# Patient Record
Sex: MALE | Race: WHITE | Employment: FULL TIME | URBAN - METROPOLITAN AREA
[De-identification: names, ages, dates, MRNs, and addresses within clinical notes are randomized per-mention and may not be internally consistent; named-entity substitution may affect disease eponyms.]

---

## 2020-12-10 ENCOUNTER — APPOINTMENT (OUTPATIENT)
Dept: GENERAL RADIOLOGY | Age: 60
DRG: 287 | End: 2020-12-10
Attending: EMERGENCY MEDICINE
Payer: COMMERCIAL

## 2020-12-10 ENCOUNTER — HOSPITAL ENCOUNTER (INPATIENT)
Age: 60
LOS: 4 days | Discharge: ANOTHER HOSPITAL | DRG: 287 | End: 2020-12-15
Attending: EMERGENCY MEDICINE | Admitting: INTERNAL MEDICINE
Payer: COMMERCIAL

## 2020-12-10 DIAGNOSIS — R09.89 BILATERAL CAROTID BRUITS: ICD-10-CM

## 2020-12-10 DIAGNOSIS — Z79.4 INSULIN DEPENDENT TYPE 2 DIABETES MELLITUS (HCC): ICD-10-CM

## 2020-12-10 DIAGNOSIS — I24.9 ACS (ACUTE CORONARY SYNDROME) (HCC): Primary | ICD-10-CM

## 2020-12-10 DIAGNOSIS — I73.9 PAD (PERIPHERAL ARTERY DISEASE) (HCC): ICD-10-CM

## 2020-12-10 DIAGNOSIS — E11.9 INSULIN DEPENDENT TYPE 2 DIABETES MELLITUS (HCC): ICD-10-CM

## 2020-12-10 DIAGNOSIS — E10.69 TYPE 1 DIABETES MELLITUS WITH OTHER SPECIFIED COMPLICATION (HCC): ICD-10-CM

## 2020-12-10 DIAGNOSIS — R07.2 PRECORDIAL PAIN: ICD-10-CM

## 2020-12-10 DIAGNOSIS — I25.700 CORONARY ARTERY DISEASE INVOLVING CORONARY BYPASS GRAFT OF NATIVE HEART WITH UNSTABLE ANGINA PECTORIS (HCC): ICD-10-CM

## 2020-12-10 PROBLEM — R07.9 CHEST PAIN: Status: ACTIVE | Noted: 2020-12-10

## 2020-12-10 LAB
ALBUMIN SERPL-MCNC: 3.7 G/DL (ref 3.5–5)
ALBUMIN/GLOB SERPL: 1.3 {RATIO} (ref 1.2–3.5)
ALP SERPL-CCNC: 76 U/L (ref 50–136)
ALT SERPL-CCNC: 28 U/L (ref 12–65)
ANION GAP SERPL CALC-SCNC: 6 MMOL/L (ref 7–16)
AST SERPL-CCNC: 20 U/L (ref 15–37)
BASOPHILS # BLD: 0 K/UL (ref 0–0.2)
BASOPHILS NFR BLD: 1 % (ref 0–2)
BILIRUB SERPL-MCNC: 0.3 MG/DL (ref 0.2–1.1)
BUN SERPL-MCNC: 18 MG/DL (ref 6–23)
CALCIUM SERPL-MCNC: 8.8 MG/DL (ref 8.3–10.4)
CHLORIDE SERPL-SCNC: 109 MMOL/L (ref 98–107)
CO2 SERPL-SCNC: 26 MMOL/L (ref 21–32)
CREAT SERPL-MCNC: 0.75 MG/DL (ref 0.8–1.5)
DIFFERENTIAL METHOD BLD: ABNORMAL
EOSINOPHIL # BLD: 0.2 K/UL (ref 0–0.8)
EOSINOPHIL NFR BLD: 4 % (ref 0.5–7.8)
ERYTHROCYTE [DISTWIDTH] IN BLOOD BY AUTOMATED COUNT: 12.1 % (ref 11.9–14.6)
EST. AVERAGE GLUCOSE BLD GHB EST-MCNC: 192 MG/DL
GLOBULIN SER CALC-MCNC: 2.9 G/DL (ref 2.3–3.5)
GLUCOSE BLD STRIP.AUTO-MCNC: 275 MG/DL (ref 65–100)
GLUCOSE SERPL-MCNC: 82 MG/DL (ref 65–100)
HBA1C MFR BLD: 8.3 % (ref 4.8–6)
HCT VFR BLD AUTO: 34.2 % (ref 41.1–50.3)
HGB BLD-MCNC: 11.8 G/DL (ref 13.6–17.2)
IMM GRANULOCYTES # BLD AUTO: 0 K/UL (ref 0–0.5)
IMM GRANULOCYTES NFR BLD AUTO: 0 % (ref 0–5)
LYMPHOCYTES # BLD: 2 K/UL (ref 0.5–4.6)
LYMPHOCYTES NFR BLD: 38 % (ref 13–44)
MCH RBC QN AUTO: 33.9 PG (ref 26.1–32.9)
MCHC RBC AUTO-ENTMCNC: 34.5 G/DL (ref 31.4–35)
MCV RBC AUTO: 98.3 FL (ref 79.6–97.8)
MONOCYTES # BLD: 0.4 K/UL (ref 0.1–1.3)
MONOCYTES NFR BLD: 7 % (ref 4–12)
NEUTS SEG # BLD: 2.6 K/UL (ref 1.7–8.2)
NEUTS SEG NFR BLD: 50 % (ref 43–78)
NRBC # BLD: 0 K/UL (ref 0–0.2)
PLATELET # BLD AUTO: 225 K/UL (ref 150–450)
PMV BLD AUTO: 9.6 FL (ref 9.4–12.3)
POTASSIUM SERPL-SCNC: 4.2 MMOL/L (ref 3.5–5.1)
PROT SERPL-MCNC: 6.6 G/DL (ref 6.3–8.2)
RBC # BLD AUTO: 3.48 M/UL (ref 4.23–5.6)
SODIUM SERPL-SCNC: 141 MMOL/L (ref 136–145)
T4 FREE SERPL-MCNC: 1.2 NG/DL (ref 0.9–1.8)
TROPONIN-HIGH SENSITIVITY: 7.5 PG/ML (ref 0–14)
TROPONIN-HIGH SENSITIVITY: 8 PG/ML (ref 0–14)
TROPONIN-HIGH SENSITIVITY: 8.4 PG/ML (ref 0–14)
TSH SERPL DL<=0.005 MIU/L-ACNC: 5.2 UIU/ML (ref 0.36–3.74)
WBC # BLD AUTO: 5.3 K/UL (ref 4.3–11.1)

## 2020-12-10 PROCEDURE — 74011250636 HC RX REV CODE- 250/636: Performed by: INTERNAL MEDICINE

## 2020-12-10 PROCEDURE — 84484 ASSAY OF TROPONIN QUANT: CPT

## 2020-12-10 PROCEDURE — 80053 COMPREHEN METABOLIC PANEL: CPT

## 2020-12-10 PROCEDURE — 99284 EMERGENCY DEPT VISIT MOD MDM: CPT

## 2020-12-10 PROCEDURE — 99218 HC RM OBSERVATION: CPT

## 2020-12-10 PROCEDURE — 74011250637 HC RX REV CODE- 250/637: Performed by: NURSE PRACTITIONER

## 2020-12-10 PROCEDURE — 84439 ASSAY OF FREE THYROXINE: CPT

## 2020-12-10 PROCEDURE — 83036 HEMOGLOBIN GLYCOSYLATED A1C: CPT

## 2020-12-10 PROCEDURE — 84443 ASSAY THYROID STIM HORMONE: CPT

## 2020-12-10 PROCEDURE — 36415 COLL VENOUS BLD VENIPUNCTURE: CPT

## 2020-12-10 PROCEDURE — 96374 THER/PROPH/DIAG INJ IV PUSH: CPT

## 2020-12-10 PROCEDURE — C8929 TTE W OR WO FOL WCON,DOPPLER: HCPCS

## 2020-12-10 PROCEDURE — 93005 ELECTROCARDIOGRAM TRACING: CPT | Performed by: EMERGENCY MEDICINE

## 2020-12-10 PROCEDURE — 74011250637 HC RX REV CODE- 250/637: Performed by: EMERGENCY MEDICINE

## 2020-12-10 PROCEDURE — 71046 X-RAY EXAM CHEST 2 VIEWS: CPT

## 2020-12-10 PROCEDURE — 85025 COMPLETE CBC W/AUTO DIFF WBC: CPT

## 2020-12-10 PROCEDURE — 99220 PR INITIAL OBSERVATION CARE/DAY 70 MINUTES: CPT | Performed by: INTERNAL MEDICINE

## 2020-12-10 PROCEDURE — 74011250636 HC RX REV CODE- 250/636: Performed by: NURSE PRACTITIONER

## 2020-12-10 PROCEDURE — 74011000250 HC RX REV CODE- 250: Performed by: INTERNAL MEDICINE

## 2020-12-10 PROCEDURE — 2709999900 HC NON-CHARGEABLE SUPPLY

## 2020-12-10 PROCEDURE — 82962 GLUCOSE BLOOD TEST: CPT

## 2020-12-10 RX ORDER — LEVOTHYROXINE SODIUM 100 UG/1
100 TABLET ORAL
COMMUNITY

## 2020-12-10 RX ORDER — NITROGLYCERIN 0.4 MG/1
0.4 TABLET SUBLINGUAL
Status: DISCONTINUED | OUTPATIENT
Start: 2020-12-10 | End: 2020-12-15 | Stop reason: HOSPADM

## 2020-12-10 RX ORDER — GUAIFENESIN 100 MG/5ML
81 LIQUID (ML) ORAL DAILY
Status: DISCONTINUED | OUTPATIENT
Start: 2020-12-11 | End: 2020-12-15 | Stop reason: HOSPADM

## 2020-12-10 RX ORDER — ASPIRIN 81 MG/1
81 TABLET ORAL DAILY
COMMUNITY

## 2020-12-10 RX ORDER — SODIUM CHLORIDE 0.9 % (FLUSH) 0.9 %
5-40 SYRINGE (ML) INJECTION EVERY 8 HOURS
Status: DISCONTINUED | OUTPATIENT
Start: 2020-12-10 | End: 2020-12-15 | Stop reason: HOSPADM

## 2020-12-10 RX ORDER — SODIUM CHLORIDE 0.9 % (FLUSH) 0.9 %
5-40 SYRINGE (ML) INJECTION AS NEEDED
Status: DISCONTINUED | OUTPATIENT
Start: 2020-12-10 | End: 2020-12-15 | Stop reason: HOSPADM

## 2020-12-10 RX ORDER — CLOPIDOGREL BISULFATE 75 MG/1
75 TABLET ORAL DAILY
COMMUNITY
End: 2020-12-15

## 2020-12-10 RX ORDER — MORPHINE SULFATE 2 MG/ML
2 INJECTION, SOLUTION INTRAMUSCULAR; INTRAVENOUS
Status: DISCONTINUED | OUTPATIENT
Start: 2020-12-10 | End: 2020-12-15 | Stop reason: HOSPADM

## 2020-12-10 RX ORDER — INSULIN LISPRO 100 [IU]/ML
INJECTION, SOLUTION INTRAVENOUS; SUBCUTANEOUS
Status: DISCONTINUED | OUTPATIENT
Start: 2020-12-10 | End: 2020-12-11

## 2020-12-10 RX ORDER — CLOPIDOGREL BISULFATE 75 MG/1
75 TABLET ORAL DAILY
Status: DISCONTINUED | OUTPATIENT
Start: 2020-12-11 | End: 2020-12-11

## 2020-12-10 RX ORDER — ATORVASTATIN CALCIUM 40 MG/1
40 TABLET, FILM COATED ORAL DAILY
Status: DISCONTINUED | OUTPATIENT
Start: 2020-12-11 | End: 2020-12-11

## 2020-12-10 RX ORDER — METOPROLOL TARTRATE 25 MG/1
12.5 TABLET, FILM COATED ORAL EVERY 12 HOURS
Status: DISCONTINUED | OUTPATIENT
Start: 2020-12-10 | End: 2020-12-15 | Stop reason: HOSPADM

## 2020-12-10 RX ORDER — INSULIN ASPART 100 [IU]/ML
INJECTION, SOLUTION INTRAVENOUS; SUBCUTANEOUS
COMMUNITY
End: 2020-12-15

## 2020-12-10 RX ORDER — SODIUM CHLORIDE 9 MG/ML
75 INJECTION, SOLUTION INTRAVENOUS CONTINUOUS
Status: DISCONTINUED | OUTPATIENT
Start: 2020-12-11 | End: 2020-12-12

## 2020-12-10 RX ADMIN — PERFLUTREN 1 ML: 6.52 INJECTION, SUSPENSION INTRAVENOUS at 15:39

## 2020-12-10 RX ADMIN — Medication 10 ML: at 20:29

## 2020-12-10 RX ADMIN — METOPROLOL TARTRATE 12.5 MG: 25 TABLET, FILM COATED ORAL at 20:30

## 2020-12-10 RX ADMIN — NITROGLYCERIN 1 INCH: 20 OINTMENT TOPICAL at 13:07

## 2020-12-10 RX ADMIN — SODIUM CHLORIDE 75 ML/HR: 900 INJECTION, SOLUTION INTRAVENOUS at 23:39

## 2020-12-10 NOTE — ED TRIAGE NOTES
Patient arrived via EMS from private home. Wearing face mask. Patient was at work and started having CP that was constant. Increased SOB with cp. HR 84bpm NSR, , O2 sat 97% on RA (EMS placed [facility] on patient for comfort), temp 98.4F oral, EMS gave ASA 324mg. IV started 20g left FA.

## 2020-12-10 NOTE — PROGRESS NOTES
TRANSFER - IN REPORT:    Verbal report received from Grady RN(name) on Radu Castro  being received from ER(unit) for routine progression of care      Report consisted of patients Situation, Background, Assessment and   Recommendations(SBAR). Information from the following report(s) ED Summary was reviewed with the receiving nurse. Opportunity for questions and clarification was provided. Assessment completed upon patients arrival to unit and care assumed.

## 2020-12-10 NOTE — PROGRESS NOTES
Primary Nurse Gabe Woodruff RN  performed a dual skin assessment on this patient No impairment noted.  Insulin pump and monitor intact  Carlitos score is 23

## 2020-12-10 NOTE — ED PROVIDER NOTES
60-year-old male presents with complaints of sternal chest pain. Described as tightness  Nonradiating  Associated with some shortness of breath  No nausea vomiting or diarrhea  No fever chills or cough    Patient reports symptoms began while doing light activities at work    Patient also reports that he had has previous heart catheterization with no interventions. States his heart cath was about 2 years ago  Patient does have a history of peripheral artery disease and has had arterial stents in his legs  Patient is a lifelong insulin-dependent diabetic  Currently monitoring his sugars with an insulin pump    The history is provided by the patient. Chest Pain    This is a new problem. The current episode started 3 to 5 hours ago. The problem has been resolved. The problem occurs constantly. The pain is associated with normal activity. The pain is present in the substernal region. The pain is moderate. The quality of the pain is described as pressure-like and tightness. The pain does not radiate. Exacerbated by: Denies specific exacerbating or alleviating factors. Associated symptoms include malaise/fatigue and shortness of breath. Pertinent negatives include no abdominal pain, no back pain, no cough, no diaphoresis, no dizziness, no exertional chest pressure, no fever, no headaches, no hemoptysis, no leg pain, no lower extremity edema, no nausea, no numbness, no palpitations, no sputum production and no vomiting. He has tried aspirin for the symptoms. The treatment provided mild relief. Risk factors include family history and male gender (Peripheral artery disease with stenting). His past medical history does not include DM. Procedural history includes cardiac catheterization. Pertinent negatives include no cardiac stents and no CABG. No past medical history on file. No past surgical history on file. No family history on file.     Social History     Socioeconomic History    Marital status:  Spouse name: Not on file    Number of children: Not on file    Years of education: Not on file    Highest education level: Not on file   Occupational History    Not on file   Social Needs    Financial resource strain: Not on file    Food insecurity     Worry: Not on file     Inability: Not on file    Transportation needs     Medical: Not on file     Non-medical: Not on file   Tobacco Use    Smoking status: Not on file   Substance and Sexual Activity    Alcohol use: Not on file    Drug use: Not on file    Sexual activity: Not on file   Lifestyle    Physical activity     Days per week: Not on file     Minutes per session: Not on file    Stress: Not on file   Relationships    Social connections     Talks on phone: Not on file     Gets together: Not on file     Attends Congregation service: Not on file     Active member of club or organization: Not on file     Attends meetings of clubs or organizations: Not on file     Relationship status: Not on file    Intimate partner violence     Fear of current or ex partner: Not on file     Emotionally abused: Not on file     Physically abused: Not on file     Forced sexual activity: Not on file   Other Topics Concern    Not on file   Social History Narrative    Not on file         ALLERGIES: Patient has no known allergies. Review of Systems   Constitutional: Positive for malaise/fatigue. Negative for activity change, chills, diaphoresis and fever. HENT: Negative for dental problem, hearing loss, nosebleeds, rhinorrhea and sore throat. Eyes: Negative for pain, discharge, redness and visual disturbance. Respiratory: Positive for shortness of breath. Negative for cough, hemoptysis, sputum production and chest tightness. Cardiovascular: Positive for chest pain. Negative for palpitations and leg swelling. Gastrointestinal: Negative for abdominal pain, constipation, diarrhea, nausea and vomiting.    Endocrine: Negative for cold intolerance, heat intolerance, polydipsia and polyuria. Genitourinary: Negative for dysuria and flank pain. Musculoskeletal: Negative for arthralgias, back pain, joint swelling, myalgias and neck pain. Skin: Negative for pallor and rash. Allergic/Immunologic: Negative for environmental allergies and food allergies. Neurological: Negative for dizziness, tremors, light-headedness, numbness and headaches. Hematological: Negative for adenopathy. Does not bruise/bleed easily. Psychiatric/Behavioral: Negative for confusion and dysphoric mood. The patient is not nervous/anxious and is not hyperactive. All other systems reviewed and are negative. Vitals:    12/10/20 1150 12/10/20 1306   BP: 134/68 (!) 158/70   Pulse: 96 76   Resp: 16    Temp: 97.9 °F (36.6 °C)    SpO2: 99%    Weight: 68.9 kg (152 lb)    Height: 6' (1.829 m)             Physical Exam  Vitals signs and nursing note reviewed. Constitutional:       General: He is in acute distress. Appearance: He is well-developed and normal weight. HENT:      Head: Normocephalic and atraumatic. Right Ear: External ear normal.      Left Ear: External ear normal.      Mouth/Throat:      Pharynx: No oropharyngeal exudate. Eyes:      General: No scleral icterus. Extraocular Movements: Extraocular movements intact. Conjunctiva/sclera: Conjunctivae normal.      Pupils: Pupils are equal, round, and reactive to light. Neck:      Musculoskeletal: Normal range of motion and neck supple. Thyroid: No thyromegaly. Vascular: No JVD. Cardiovascular:      Rate and Rhythm: Normal rate and regular rhythm. Heart sounds: Normal heart sounds. No murmur. No friction rub. No gallop. Pulmonary:      Effort: Pulmonary effort is normal. No respiratory distress. Breath sounds: Normal breath sounds. No decreased breath sounds or wheezing. Abdominal:      General: Bowel sounds are normal. There is no distension. Palpations: Abdomen is soft. Tenderness: There is no abdominal tenderness. Musculoskeletal: Normal range of motion. General: No tenderness or deformity. Skin:     General: Skin is warm and dry. Capillary Refill: Capillary refill takes less than 2 seconds. Findings: No rash. Neurological:      General: No focal deficit present. Mental Status: He is alert and oriented to person, place, and time. Cranial Nerves: No cranial nerve deficit. Sensory: No sensory deficit. Motor: No abnormal muscle tone. Coordination: Coordination normal.   Psychiatric:         Mood and Affect: Mood normal.         Behavior: Behavior normal.         Thought Content: Thought content normal.         Judgment: Judgment normal.          MDM  Number of Diagnoses or Management Options  ACS (acute coronary syndrome) [facility]): new and requires workup  Diagnosis management comments: Patient given aspirin by EMS in route to the hospital    EKG reviewed  Sinus rhythm  Normal axis, normal intervals no ectopy  No acute ischemic changes      2:46 PM  Initial troponin negative  Other lab work unremarkable    Case reviewed with cardiology  They will admit the patient for heart cath in the a.m. Amount and/or Complexity of Data Reviewed  Clinical lab tests: ordered and reviewed  Tests in the radiology section of CPT®: ordered and reviewed  Tests in the medicine section of CPT®: ordered and reviewed  Decide to obtain previous medical records or to obtain history from someone other than the patient: yes  Review and summarize past medical records: yes  Discuss the patient with other providers: yes  Independent visualization of images, tracings, or specimens: yes    Risk of Complications, Morbidity, and/or Mortality  Presenting problems: high  Diagnostic procedures: moderate  Management options: moderate  General comments: Elements of this note have been dictated via voice recognition software.   Text and phrases may be limited by the accuracy of the software. The chart has been reviewed, but errors may still be present.       Patient Progress  Patient progress: stable         Procedures

## 2020-12-10 NOTE — H&P
[place] [facility] History & Physical      Date of  Admission: 12/10/2020 12:34 PM     Primary Care Physician: Unknown  Primary Cardiologist: None  Referring Physician: Dr Espino Notice  Admitting Physician: Dr Mon Minus: Chest pain     HPI:  Farshad Newman is a 63 y.o. male with PMH of PAD s/p stents, insulin-dependent DM, dyslipidemia, HTN, hypothyroidism, and nicotine use who presented to [facility] ED on 12/10 with complaint of chest pain. 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 Describes chest pain as substernal and pressure-like. Had associated SOB. Denies any radiation, N/V, or dizziness. Nothing made pain worse and nothing improved the pain. Rated pain 3/10. Took 4 baby ASA. Pain resolved on its own prior to arrival to ED. Patient reports several episodes of similar chest discomfort over the last several months but states these episodes haven't lasted as long as today's episode. Upon arrival to ED, chest pain has resolved, EKG showed NSR, high sensitivity troponin 8.0, WBC 5.5, H/H 11.8/34.2, Ptl 225, BUN/Cr 18/0.75. Cardiology consulted for further evaluation.         PMH:  SEE ABOVE    No Known Allergies   Social History     Socioeconomic History    Marital status:      Spouse name: Not on file    Number of children: Not on file    Years of education: Not on file    Highest education level: Not on file   Occupational History    Not on file   Social Needs    Financial resource strain: Not on file    Food insecurity     Worry: Not on file     Inability: Not on file    Transportation needs     Medical: Not on file     Non-medical: Not on file   Tobacco Use    Smoking status: Not on file   Substance and Sexual Activity    Alcohol use: Not on file    Drug use: Not on file    Sexual activity: Not on file   Lifestyle    Physical activity     Days per week: Not on file     Minutes per session: Not on file    Stress: Not on file   Relationships    Pharmacy Vancomycin Initial Note  Date of Service 2017  Patient's  1957  59 year old, female    Indication: diabetic foot infection    Current estimated CrCl = Estimated Creatinine Clearance: 50.9 mL/min (based on Cr of 1.45).    Creatinine for last 3 days  2017:  9:20 PM Creatinine 1.45 mg/dL    Recent Vancomycin Level(s) for last 3 days  No results found for requested labs within last 72 hours.      Vancomycin IV Administrations (past 72 hours)                   vancomycin (VANCOCIN) 1000 mg in dextrose 5% 200 mL PREMIX (mg) 1,000 mg New Bag 17 0141                Nephrotoxins and other renal medications (Future)    Start     Dose/Rate Route Frequency Ordered Stop    17 1400  vancomycin (VANCOCIN) 2,000 mg in NaCl 0.9 % 500 mL intermittent infusion      2,000 mg Intravenous EVERY 12 HOURS 17 0523      17 0900  lisinopril (PRINIVIL/ZESTRIL) tablet 5 mg      5 mg Oral DAILY 17 0455      17 0515  vancomycin (VANCOCIN) 1000 mg in dextrose 5% 200 mL PREMIX      1,000 mg Intravenous ONCE 17 0508      17 0000  ampicillin-sulbactam (UNASYN) 3 g vial to attach to  mL bag      3 g  over 1 Hours Intravenous EVERY 6 HOURS 17 2357            Contrast Orders - past 72 hours     None                Plan:  1.  Start vancomycin  2000 mg IV q12h.   2.  Goal Trough Level: 15-20 mg/L   3.  Pharmacy will check trough levels as appropriate in 1-3 Days.    4. Serum creatinine levels will be ordered daily for the first week of therapy and at least twice weekly for subsequent weeks.    5. [place] method utilized to dose vancomycin therapy: Method 1    Kristie YoungOur Lady of Fat[facility] HospitalVirginia       Social connections     Talks on phone: Not on file     Gets together: Not on file     Attends [employer] service: Not on file     Active member of club or organization: Not on file     Attends meetings of clubs or organizations: Not on file     Relationship status: Not on file    Intimate partner violence     Fear of current or ex partner: Not on file     Emotionally abused: Not on file     Physically abused: Not on file     Forced sexual activity: Not on file   Other Topics Concern    Not on file   Social History Narrative    Not on file     Family History- Brother with PCI IN EARLY 60'S     Current Facility-Administered Medications   Medication Dose Route Frequency    perflutren lipid microspheres (DEFINITY) in NS bolus IV  1 mL IntraVENous PRN     Review of Symptoms:  General: No weight changes,  weakness, fever or chills  Skin: no rashes, lumps, or other skin changes  HEENT: no headache, dizziness, lightheadedness, vision changes, hearing changes, tinnitus, vertigo, sinus pressure/pain, bleeding gums, sore throat, or hoarseness  Neck: no swollen glands, goiter, pain or stiffness  Respiratory: Positive for dyspnea. No cough, sputum, hemoptysis, wheezing  Cardiovascular: Positive for chest pain, dyspnea.  No palpitations,  orthopnea, paroxysmal nocturnal dyspnea, peripheral edema   Gastrointestinal: no GERD, constipation, diarrhea, liver problems, or h/o GI bleed  Urinary: no frequency, urgency , hematuria, burning/pain with urination, recent flank pain, polyuria, nocturia, or difficulty urinating  Peripheral Vascular: no claudication, leg cramps, prior DVTs, swelling of calves, legs, or feet, color change, or swelling with redness or tenderness  Musculoskeletal: no muscle or joint pain/stiffness, joint swelling, erythema of joints, or back pain  Psychiatric: no depression or excessive stress  Neurological: no sensory or motor loss, seizures, syncope, tremors, numbness, no dementia  Hematologic: no anemia, easy bruising or bleeding  Endocrine: Positive for thyroid problems, diabetes.      Subjective:     Physical Exam:    Vitals:    12/10/20 1306 12/10/20 1320 12/10/20 1350 12/10/20 1514   BP: (!) 158/70 (!) 148/60 129/63 (!) 146/67   Pulse: 76 70 68 79   Resp:       Temp:       SpO2:       Weight:       Height:           General: Well Developed, Well Nourished, No Acute Distress  HEENT: pupils equal and round, no abnormalities noted  Neck: supple, no JVD, positive for carotid bruits  Heart: S1S2 with RRR without murmurs or gallops  Lungs: Clear throughout auscultation bilaterally without adventitious sounds  Abd: soft, nontender, nondistended, with good bowel sounds  Ext: warm, no edema, calves supple/nontender, pulses 2+ bilaterally  Skin: warm and dry  Psychiatric: Normal mood and affect  Neurologic: Alert and oriented X 3    Cardiographics    Telemetry: normal sinus rhythm  ECG: normal EKG, normal sinus rhythm, there are no previous tracings available for comparison  Echocardiogram: ordered     Labs:   Recent Results (from the past 24 hour(s))   EKG, 12 LEAD, INITIAL    Collection Time: 12/10/20 11:52 AM   Result Value Ref Range    Ventricular Rate 69 BPM    Atrial Rate 69 BPM    P-R Interval 130 ms    QRS Duration 84 ms    Q-T Interval 364 ms    QTC Calculation (Bezet) 390 ms    Calculated P Axis 77 degrees    Calculated R Axis 68 degrees    Calculated T Axis 78 degrees    Diagnosis       Normal sinus rhythm  Normal ECG  No previous ECGs available     CBC WITH AUTOMATED DIFF    Collection Time: 12/10/20 12:03 PM   Result Value Ref Range    WBC 5.3 4.3 - 11.1 K/uL    RBC 3.48 (L) 4.23 - 5.6 M/uL    HGB 11.8 (L) 13.6 - 17.2 g/dL    HCT 34.2 (L) 41.1 - 50.3 %    MCV 98.3 (H) 79.6 - 97.8 FL    MCH 33.9 (H) 26.1 - 32.9 PG    MCHC 34.5 31.4 - 35.0 g/dL    RDW 12.1 11.9 - 14.6 %    PLATELET 167 192 - 272 K/uL    MPV 9.6 9.4 - 12.3 FL    ABSOLUTE NRBC 0.00 0.0 - 0.2 K/uL    DF AUTOMATED      NEUTROPHILS 50 43 - 78 % LYMPHOCYTES 38 13 - 44 %    MONOCYTES 7 4.0 - 12.0 %    EOSINOPHILS 4 0.5 - 7.8 %    BASOPHILS 1 0.0 - 2.0 %    IMMATURE GRANULOCYTES 0 0.0 - 5.0 %    ABS. NEUTROPHILS 2.6 1.7 - 8.2 K/UL    ABS. LYMPHOCYTES 2.0 0.5 - 4.6 K/UL    ABS. MONOCYTES 0.4 0.1 - 1.3 K/UL    ABS. EOSINOPHILS 0.2 0.0 - 0.8 K/UL    ABS. BASOPHILS 0.0 0.0 - 0.2 K/UL    ABS. IMM. GRANS. 0.0 0.0 - 0.5 K/UL   METABOLIC PANEL, COMPREHENSIVE    Collection Time: 12/10/20 12:03 PM   Result Value Ref Range    Sodium 141 136 - 145 mmol/L    Potassium 4.2 3.5 - 5.1 mmol/L    Chloride 109 (H) 98 - 107 mmol/L    CO2 26 21 - 32 mmol/L    Anion gap 6 (L) 7 - 16 mmol/L    Glucose 82 65 - 100 mg/dL    BUN 18 6 - 23 MG/DL    Creatinine 0.75 (L) 0.8 - 1.5 MG/DL    GFR est AA >60 >60 ml/min/1.73m2    GFR est non-AA >60 >60 ml/min/1.73m2    Calcium 8.8 8.3 - 10.4 MG/DL    Bilirubin, total 0.3 0.2 - 1.1 MG/DL    ALT (SGPT) 28 12 - 65 U/L    AST (SGOT) 20 15 - 37 U/L    Alk. phosphatase 76 50 - 136 U/L    Protein, total 6.6 6.3 - 8.2 g/dL    Albumin 3.7 3.5 - 5.0 g/dL    Globulin 2.9 2.3 - 3.5 g/dL    A-G Ratio 1.3 1.2 - 3.5     TROPONIN-HIGH SENSITIVITY    Collection Time: 12/10/20 12:03 PM   Result Value Ref Range    Troponin-High Sensitivity 8.0 0 - 14 pg/mL   TROPONIN-HIGH SENSITIVITY    Collection Time: 12/10/20  2:03 PM   Result Value Ref Range    Troponin-High Sensitivity 7.5 0 - 14 pg/mL     Pt has been seen and examined by Dr. Azul Quispe and he agrees with the following assessment and plan:     Assessment/Plan:      Chest pain- admit to telemetry, trend troponin, ECHO, start low dose BB, cont ASA, Plavix, NPO after midnight for LHC in morning     Insulin-dependent DM- SSI, check Hgb A1C    Dyslipidemia- Lipid panel in am, change Zocor to Lipitor    Carotid bruits- Ultrasound Carotids    PAD s/p stents- cont ASA, Plavix    KIKI Dominguez    Physician Assistant    Cardiology        ATTENDING ADDENDUM:    Patient seen and examined by me.   Agree with above note by physician extender. Key findings are:  No CP or RODRIGUEZ AT PRESENT but exertional CP with radiation across chest with SOB during mild exertion at work today. ECG and trop negative x 1. No pain or SOB at present. Known PAD s/p prior LE stenting in [facility], bilateral carotid bruits on exam today and IDDM with insulin pump, last A1C in 7's per his report. CV- RRR without murmur, BL carotid bruit R>L  Lungs- Clear bilaterally  Abd- soft, nontender, nondistended  Ext- no edema    Plan: As above. Admit, rule out, echo today, A1C and Lipids in AM, carotid duplex in AM, continue home meds with addition of heparin gtt with more CP or + HS trop overnight. Hydrate for LHC poss tomorrow. The benefits and risks of left heart catheterization and possible percutaneous intervention were discussed with the patient. Risks including but not limited to bleeding, infection, contrast allergy reaction, acute kidney injury, MI, stroke, emergent CABG and death were discussed. The patient understands the risks of the procedure and wishes to proceed.      Aliyah Pradhan MD  [facility]  Pager [phone]

## 2020-12-11 ENCOUNTER — APPOINTMENT (OUTPATIENT)
Dept: ULTRASOUND IMAGING | Age: 60
DRG: 287 | End: 2020-12-11
Attending: NURSE PRACTITIONER
Payer: COMMERCIAL

## 2020-12-11 PROBLEM — E10.69 TYPE 1 DIABETES MELLITUS WITH OTHER SPECIFIED COMPLICATION (HCC): Status: ACTIVE | Noted: 2020-12-11

## 2020-12-11 PROBLEM — I20.0 UNSTABLE ANGINA (HCC): Status: ACTIVE | Noted: 2020-12-11

## 2020-12-11 LAB
ALBUMIN SERPL-MCNC: 3.2 G/DL (ref 3.5–5)
ALBUMIN/GLOB SERPL: 1.2 {RATIO} (ref 1.2–3.5)
ALP SERPL-CCNC: 68 U/L (ref 50–136)
ALT SERPL-CCNC: 24 U/L (ref 12–65)
ANION GAP SERPL CALC-SCNC: 3 MMOL/L (ref 7–16)
AST SERPL-CCNC: 15 U/L (ref 15–37)
ATRIAL RATE: 69 BPM
BASOPHILS # BLD: 0.1 K/UL (ref 0–0.2)
BASOPHILS NFR BLD: 1 % (ref 0–2)
BILIRUB DIRECT SERPL-MCNC: 0.1 MG/DL
BILIRUB SERPL-MCNC: 0.4 MG/DL (ref 0.2–1.1)
BUN SERPL-MCNC: 17 MG/DL (ref 6–23)
CALCIUM SERPL-MCNC: 8.5 MG/DL (ref 8.3–10.4)
CALCULATED P AXIS, ECG09: 77 DEGREES
CALCULATED R AXIS, ECG10: 68 DEGREES
CALCULATED T AXIS, ECG11: 78 DEGREES
CHLORIDE SERPL-SCNC: 112 MMOL/L (ref 98–107)
CHOLEST SERPL-MCNC: 141 MG/DL
CO2 SERPL-SCNC: 29 MMOL/L (ref 21–32)
CREAT SERPL-MCNC: 0.71 MG/DL (ref 0.8–1.5)
DIAGNOSIS, 93000: NORMAL
DIFFERENTIAL METHOD BLD: ABNORMAL
EOSINOPHIL # BLD: 0.3 K/UL (ref 0–0.8)
EOSINOPHIL NFR BLD: 6 % (ref 0.5–7.8)
ERYTHROCYTE [DISTWIDTH] IN BLOOD BY AUTOMATED COUNT: 12 % (ref 11.9–14.6)
GLOBULIN SER CALC-MCNC: 2.6 G/DL (ref 2.3–3.5)
GLUCOSE BLD STRIP.AUTO-MCNC: 180 MG/DL (ref 65–100)
GLUCOSE BLD STRIP.AUTO-MCNC: 281 MG/DL (ref 65–100)
GLUCOSE BLD STRIP.AUTO-MCNC: 78 MG/DL (ref 65–100)
GLUCOSE SERPL-MCNC: 86 MG/DL (ref 65–100)
HCT VFR BLD AUTO: 33.7 % (ref 41.1–50.3)
HDLC SERPL-MCNC: 48 MG/DL (ref 40–60)
HDLC SERPL: 2.9 {RATIO}
HGB BLD-MCNC: 11.8 G/DL (ref 13.6–17.2)
IMM GRANULOCYTES # BLD AUTO: 0 K/UL (ref 0–0.5)
IMM GRANULOCYTES NFR BLD AUTO: 0 % (ref 0–5)
LDLC SERPL CALC-MCNC: 82.8 MG/DL
LIPID PROFILE,FLP: NORMAL
LYMPHOCYTES # BLD: 2.7 K/UL (ref 0.5–4.6)
LYMPHOCYTES NFR BLD: 51 % (ref 13–44)
MCH RBC QN AUTO: 34.4 PG (ref 26.1–32.9)
MCHC RBC AUTO-ENTMCNC: 35 G/DL (ref 31.4–35)
MCV RBC AUTO: 98.3 FL (ref 79.6–97.8)
MONOCYTES # BLD: 0.4 K/UL (ref 0.1–1.3)
MONOCYTES NFR BLD: 8 % (ref 4–12)
NEUTS SEG # BLD: 1.8 K/UL (ref 1.7–8.2)
NEUTS SEG NFR BLD: 34 % (ref 43–78)
NRBC # BLD: 0 K/UL (ref 0–0.2)
P-R INTERVAL, ECG05: 130 MS
PLATELET # BLD AUTO: 205 K/UL (ref 150–450)
PMV BLD AUTO: 9.6 FL (ref 9.4–12.3)
POTASSIUM SERPL-SCNC: 4.2 MMOL/L (ref 3.5–5.1)
PROT SERPL-MCNC: 5.8 G/DL (ref 6.3–8.2)
Q-T INTERVAL, ECG07: 364 MS
QRS DURATION, ECG06: 84 MS
QTC CALCULATION (BEZET), ECG08: 390 MS
RBC # BLD AUTO: 3.43 M/UL (ref 4.23–5.6)
SODIUM SERPL-SCNC: 144 MMOL/L (ref 136–145)
TRIGL SERPL-MCNC: 51 MG/DL (ref 35–150)
VENTRICULAR RATE, ECG03: 69 BPM
VLDLC SERPL CALC-MCNC: 10.2 MG/DL (ref 6–23)
WBC # BLD AUTO: 5.3 K/UL (ref 4.3–11.1)

## 2020-12-11 PROCEDURE — 74011636637 HC RX REV CODE- 636/637: Performed by: INTERNAL MEDICINE

## 2020-12-11 PROCEDURE — 96374 THER/PROPH/DIAG INJ IV PUSH: CPT

## 2020-12-11 PROCEDURE — B2111ZZ FLUOROSCOPY OF MULTIPLE CORONARY ARTERIES USING LOW OSMOLAR CONTRAST: ICD-10-PCS | Performed by: INTERNAL MEDICINE

## 2020-12-11 PROCEDURE — 2709999900 HC NON-CHARGEABLE SUPPLY

## 2020-12-11 PROCEDURE — B2151ZZ FLUOROSCOPY OF LEFT HEART USING LOW OSMOLAR CONTRAST: ICD-10-PCS | Performed by: INTERNAL MEDICINE

## 2020-12-11 PROCEDURE — 99152 MOD SED SAME PHYS/QHP 5/>YRS: CPT | Performed by: INTERNAL MEDICINE

## 2020-12-11 PROCEDURE — 74011000250 HC RX REV CODE- 250: Performed by: INTERNAL MEDICINE

## 2020-12-11 PROCEDURE — 80048 BASIC METABOLIC PNL TOTAL CA: CPT

## 2020-12-11 PROCEDURE — 77030016699 HC CATH ANGI DX INFN1 CARD -A

## 2020-12-11 PROCEDURE — 85025 COMPLETE CBC W/AUTO DIFF WBC: CPT

## 2020-12-11 PROCEDURE — 74011250637 HC RX REV CODE- 250/637: Performed by: NURSE PRACTITIONER

## 2020-12-11 PROCEDURE — 74011636637 HC RX REV CODE- 636/637: Performed by: PHYSICIAN ASSISTANT

## 2020-12-11 PROCEDURE — 36415 COLL VENOUS BLD VENIPUNCTURE: CPT

## 2020-12-11 PROCEDURE — 77030042317 HC BND COMPR HEMSTAT -B

## 2020-12-11 PROCEDURE — 99152 MOD SED SAME PHYS/QHP 5/>YRS: CPT

## 2020-12-11 PROCEDURE — C1894 INTRO/SHEATH, NON-LASER: HCPCS

## 2020-12-11 PROCEDURE — 80061 LIPID PANEL: CPT

## 2020-12-11 PROCEDURE — 4A023N7 MEASUREMENT OF CARDIAC SAMPLING AND PRESSURE, LEFT HEART, PERCUTANEOUS APPROACH: ICD-10-PCS | Performed by: INTERNAL MEDICINE

## 2020-12-11 PROCEDURE — 93880 EXTRACRANIAL BILAT STUDY: CPT

## 2020-12-11 PROCEDURE — C1769 GUIDE WIRE: HCPCS

## 2020-12-11 PROCEDURE — 65660000000 HC RM CCU STEPDOWN

## 2020-12-11 PROCEDURE — 80076 HEPATIC FUNCTION PANEL: CPT

## 2020-12-11 PROCEDURE — 74011250637 HC RX REV CODE- 250/637: Performed by: PHYSICIAN ASSISTANT

## 2020-12-11 PROCEDURE — 99218 HC RM OBSERVATION: CPT

## 2020-12-11 PROCEDURE — 77030015766

## 2020-12-11 PROCEDURE — 74011000636 HC RX REV CODE- 636: Performed by: INTERNAL MEDICINE

## 2020-12-11 PROCEDURE — 82962 GLUCOSE BLOOD TEST: CPT

## 2020-12-11 PROCEDURE — 93458 L HRT ARTERY/VENTRICLE ANGIO: CPT | Performed by: INTERNAL MEDICINE

## 2020-12-11 PROCEDURE — 74011250636 HC RX REV CODE- 250/636: Performed by: INTERNAL MEDICINE

## 2020-12-11 PROCEDURE — 93458 L HRT ARTERY/VENTRICLE ANGIO: CPT

## 2020-12-11 RX ORDER — LIDOCAINE HYDROCHLORIDE 10 MG/ML
3-20 INJECTION, SOLUTION EPIDURAL; INFILTRATION; INTRACAUDAL; PERINEURAL ONCE
Status: COMPLETED | OUTPATIENT
Start: 2020-12-11 | End: 2020-12-11

## 2020-12-11 RX ORDER — INSULIN LISPRO 100 [IU]/ML
INJECTION, SOLUTION INTRAVENOUS; SUBCUTANEOUS
Status: DISCONTINUED | OUTPATIENT
Start: 2020-12-11 | End: 2020-12-15 | Stop reason: HOSPADM

## 2020-12-11 RX ORDER — HEPARIN SODIUM 200 [USP'U]/100ML
2 INJECTION, SOLUTION INTRAVENOUS CONTINUOUS
Status: DISCONTINUED | OUTPATIENT
Start: 2020-12-11 | End: 2020-12-15 | Stop reason: HOSPADM

## 2020-12-11 RX ORDER — SODIUM CHLORIDE 0.9 % (FLUSH) 0.9 %
5-40 SYRINGE (ML) INJECTION AS NEEDED
Status: DISCONTINUED | OUTPATIENT
Start: 2020-12-11 | End: 2020-12-15 | Stop reason: HOSPADM

## 2020-12-11 RX ORDER — SODIUM CHLORIDE 0.9 % (FLUSH) 0.9 %
5-40 SYRINGE (ML) INJECTION EVERY 8 HOURS
Status: DISCONTINUED | OUTPATIENT
Start: 2020-12-11 | End: 2020-12-15 | Stop reason: HOSPADM

## 2020-12-11 RX ORDER — FENTANYL CITRATE 50 UG/ML
25-100 INJECTION, SOLUTION INTRAMUSCULAR; INTRAVENOUS
Status: DISCONTINUED | OUTPATIENT
Start: 2020-12-11 | End: 2020-12-15 | Stop reason: HOSPADM

## 2020-12-11 RX ORDER — ATORVASTATIN CALCIUM 80 MG/1
80 TABLET, FILM COATED ORAL DAILY
Status: DISCONTINUED | OUTPATIENT
Start: 2020-12-12 | End: 2020-12-15 | Stop reason: HOSPADM

## 2020-12-11 RX ORDER — SODIUM CHLORIDE 9 MG/ML
75 INJECTION, SOLUTION INTRAVENOUS CONTINUOUS
Status: DISCONTINUED | OUTPATIENT
Start: 2020-12-11 | End: 2020-12-12

## 2020-12-11 RX ORDER — MIDAZOLAM HYDROCHLORIDE 1 MG/ML
.5-2 INJECTION, SOLUTION INTRAMUSCULAR; INTRAVENOUS
Status: DISCONTINUED | OUTPATIENT
Start: 2020-12-11 | End: 2020-12-15 | Stop reason: HOSPADM

## 2020-12-11 RX ORDER — DEXTROSE 50 % IN WATER (D50W) INTRAVENOUS SYRINGE
Status: ACTIVE
Start: 2020-12-11 | End: 2020-12-11

## 2020-12-11 RX ORDER — LEVOTHYROXINE SODIUM 100 UG/1
100 TABLET ORAL
Status: DISCONTINUED | OUTPATIENT
Start: 2020-12-11 | End: 2020-12-15 | Stop reason: HOSPADM

## 2020-12-11 RX ORDER — DEXTROSE 50 % IN WATER (D50W) INTRAVENOUS SYRINGE
25 AS NEEDED
Status: DISCONTINUED | OUTPATIENT
Start: 2020-12-11 | End: 2020-12-15 | Stop reason: HOSPADM

## 2020-12-11 RX ORDER — INSULIN GLARGINE 100 [IU]/ML
15 INJECTION, SOLUTION SUBCUTANEOUS
Status: DISCONTINUED | OUTPATIENT
Start: 2020-12-11 | End: 2020-12-12

## 2020-12-11 RX ADMIN — FENTANYL CITRATE 25 MCG: 50 INJECTION, SOLUTION INTRAMUSCULAR; INTRAVENOUS at 15:36

## 2020-12-11 RX ADMIN — Medication 10 ML: at 21:22

## 2020-12-11 RX ADMIN — SODIUM CHLORIDE 75 ML/HR: 900 INJECTION, SOLUTION INTRAVENOUS at 18:38

## 2020-12-11 RX ADMIN — VERAPAMIL HYDROCHLORIDE 2 ML: 2.5 INJECTION, SOLUTION INTRAVENOUS at 15:38

## 2020-12-11 RX ADMIN — Medication 10 ML: at 17:32

## 2020-12-11 RX ADMIN — Medication 10 ML: at 05:01

## 2020-12-11 RX ADMIN — CLOPIDOGREL BISULFATE 75 MG: 75 TABLET ORAL at 09:08

## 2020-12-11 RX ADMIN — IOPAMIDOL 70 ML: 755 INJECTION, SOLUTION INTRAVENOUS at 15:47

## 2020-12-11 RX ADMIN — LEVOTHYROXINE SODIUM 100 MCG: 0.1 TABLET ORAL at 09:10

## 2020-12-11 RX ADMIN — HEPARIN SODIUM 2 UNITS/HR: 5000 INJECTION, SOLUTION INTRAVENOUS; SUBCUTANEOUS at 15:25

## 2020-12-11 RX ADMIN — METOPROLOL TARTRATE 12.5 MG: 25 TABLET, FILM COATED ORAL at 21:26

## 2020-12-11 RX ADMIN — METOPROLOL TARTRATE 12.5 MG: 25 TABLET, FILM COATED ORAL at 09:08

## 2020-12-11 RX ADMIN — MIDAZOLAM 2 MG: 1 INJECTION INTRAMUSCULAR; INTRAVENOUS at 15:36

## 2020-12-11 RX ADMIN — DEXTROSE MONOHYDRATE 25 G: 25 INJECTION, SOLUTION INTRAVENOUS at 05:05

## 2020-12-11 RX ADMIN — ASPIRIN 81 MG CHEWABLE TABLET 81 MG: 81 TABLET CHEWABLE at 09:08

## 2020-12-11 RX ADMIN — ATORVASTATIN CALCIUM 40 MG: 40 TABLET, FILM COATED ORAL at 09:08

## 2020-12-11 RX ADMIN — INSULIN GLARGINE 15 UNITS: 100 INJECTION, SOLUTION SUBCUTANEOUS at 21:21

## 2020-12-11 RX ADMIN — INSULIN LISPRO 3 UNITS: 100 INJECTION, SOLUTION INTRAVENOUS; SUBCUTANEOUS at 21:21

## 2020-12-11 RX ADMIN — LIDOCAINE HYDROCHLORIDE 3 ML: 10 INJECTION, SOLUTION EPIDURAL; INFILTRATION; INTRACAUDAL; PERINEURAL at 15:38

## 2020-12-11 NOTE — CONSULTS
Hospitalist Consult Note     Admit Date:  12/10/2020 12:34 PM   Name:  Miguel Block   Age:  63 y.o.  :  1960   MRN:  [id number]   PCP:  None  Treatment Team: Attending Provider: Delia Carter MD; Consulting Provider: Delia Carter MD; Care Manager: Santi Singh, RN; Primary Nurse: Juan Haque RN; Consulting Provider: Pratima Ayala DO    HPI:   56-year-old male with known peripheral vascular disease status post intervention with stenting, hypertension hypothyroidism hyperlipidemia and smoker. He is a type I diabetic with an insulin pump with approximately 14 units of basal insulin daily ranging from 0.65 units from 12 to 7 AM hours and 0.55 units/h remaining hours of the day. His hemoglobin A1c was 8.3%. His insulin pump was being managed but is about to run out and he needs orders for subcutaneous replacement coverage. He presented with chest pain and has symptomatic three-vessel coronary artery disease by cardiac catheterization today. CT surgery consultation is pending. Home medications reviewed include aspirin Plavix Synthroid and not below. 10 systems reviewed and negative except as noted in HPI.   Past Medical History:   Diagnosis Date    Diabetes ([address]     Thyroid disease       Past Surgical History:   Procedure Laterality Date    HX APPENDECTOMY      HX GI      HX ORTHOPAEDIC      broken heel    VASCULAR SURGERY PROCEDURE UNLIST      stents to [facility]      Current Facility-Administered Medications   Medication Dose Route Frequency    dextrose (D50W) injection syrg 25 g  25 g IntraVENous PRN    levothyroxine (SYNTHROID) tablet 100 mcg  100 mcg Oral 6am    insulin lispro (HUMALOG) injection   SubCUTAneous AC&HS    fentaNYL citrate (PF) injection  mcg   mcg IntraVENous Multiple    heparin (PF) 2 units/ml in NS infusion  2 Units/hr IntraarTERial CONTINUOUS    midazolam (VERSED) injection 0.5-2 mg  0.5-2 mg IntraVENous Multiple    0.9% sodium chloride infusion  75 mL/hr IntraVENous CONTINUOUS    sodium chloride (NS) flush 5-40 mL  5-40 mL IntraVENous Q8H    sodium chloride (NS) flush 5-40 mL  5-40 mL IntraVENous PRN    [START ON 12/12/2020] atorvastatin (LIPITOR) tablet 80 mg  80 mg Oral DAILY    insulin glargine (LANTUS) injection 15 Units  15 Units SubCUTAneous QHS    sodium chloride (NS) flush 5-40 mL  5-40 mL IntraVENous Q8H    sodium chloride (NS) flush 5-40 mL  5-40 mL IntraVENous PRN    aspirin chewable tablet 81 mg  81 mg Oral DAILY    morphine injection 2 mg  2 mg IntraVENous Q4H PRN    nitroglycerin (NITROSTAT) tablet 0.4 mg  0.4 mg SubLINGual Q5MIN PRN    0.9% sodium chloride infusion  75 mL/hr IntraVENous CONTINUOUS    metoprolol tartrate (LOPRESSOR) tablet 12.5 mg  12.5 mg Oral Q12H     No Known Allergies   Social History     Tobacco Use    Smoking status: Never Smoker   Substance Use Topics    Alcohol use: Never     Frequency: Never      No family history on file. There is no immunization history for the selected administration types on file for this patient. Objective:     Patient Vitals for the past 24 hrs:   Temp Pulse Resp BP SpO2   12/11/20 1602 97.9 °F (36.6 °C) 63 16 (!) 114/57 95 %   12/11/20 1550  60 16 120/60 98 %   12/11/20 1253 98.4 °F (36.9 °C) (!) 58 18 (!) 108/57 93 %   12/11/20 0908 98.5 °F (36.9 °C) 68 17 (!) 104/53 95 %   12/11/20 0447 97.7 °F (36.5 °C) (!) 58 16 (!) 105/57 97 %   12/11/20 0033 97.6 °F (36.4 °C) 60 17 (!) 117/58 97 %   12/10/20 2031 98.1 °F (36.7 °C) 69 16 (!) 113/56 95 %   12/10/20 2030  70        Oxygen Therapy  O2 Sat (%): 95 % (12/11/20 1602)  Pulse via Oximetry: 70 beats per minute (12/10/20 1150)  O2 Device: Room air (12/11/20 1550)    Intake/Output Summary (Last 24 hours) at 12/11/2020 1815  Last data filed at 12/10/2020 1849  Gross per 24 hour   Intake 180 ml   Output    Net 180 ml       Physical Exam:  General:    Well nourished. Alert.     Eyes: Normal sclera. Extraocular movements intact. ENT:  Normocephalic, atraumatic. Moist mucous membranes  CV:   RRR. No murmur, rub, or gallop. Lungs:  CTAB. No wheezing, rhonchi, or rales. Abdomen: Soft, nontender, nondistended. Bowel sounds normal.   Extremities: Warm and dry. No cyanosis or edema. Neurologic: CN II-XII grossly intact. Sensation intact. Skin:     No rashes or jaundice. Psych:  Normal mood and affect. I reviewed the labs, imaging, EKGs, telemetry, and other studies done this admission. Data Review:   Recent Results (from the past 24 hour(s))   TROPONIN-HIGH SENSITIVITY    Collection Time: 12/10/20  8:24 PM   Result Value Ref Range    Troponin-High Sensitivity 8.4 0 - 14 pg/mL   METABOLIC PANEL, BASIC    Collection Time: 12/11/20  3:56 AM   Result Value Ref Range    Sodium 144 136 - 145 mmol/L    Potassium 4.2 3.5 - 5.1 mmol/L    Chloride 112 (H) 98 - 107 mmol/L    CO2 29 21 - 32 mmol/L    Anion gap 3 (L) 7 - 16 mmol/L    Glucose 86 65 - 100 mg/dL    BUN 17 6 - 23 MG/DL    Creatinine 0.71 (L) 0.8 - 1.5 MG/DL    GFR est AA >60 >60 ml/min/1.73m2    GFR est non-AA >60 >60 ml/min/1.73m2    Calcium 8.5 8.3 - 10.4 MG/DL   CBC WITH AUTOMATED DIFF    Collection Time: 12/11/20  3:56 AM   Result Value Ref Range    WBC 5.3 4.3 - 11.1 K/uL    RBC 3.43 (L) 4.23 - 5.6 M/uL    HGB 11.8 (L) 13.6 - 17.2 g/dL    HCT 33.7 (L) 41.1 - 50.3 %    MCV 98.3 (H) 79.6 - 97.8 FL    MCH 34.4 (H) 26.1 - 32.9 PG    MCHC 35.0 31.4 - 35.0 g/dL    RDW 12.0 11.9 - 14.6 %    PLATELET 645 655 - 120 K/uL    MPV 9.6 9.4 - 12.3 FL    ABSOLUTE NRBC 0.00 0.0 - 0.2 K/uL    DF AUTOMATED      NEUTROPHILS 34 (L) 43 - 78 %    LYMPHOCYTES 51 (H) 13 - 44 %    MONOCYTES 8 4.0 - 12.0 %    EOSINOPHILS 6 0.5 - 7.8 %    BASOPHILS 1 0.0 - 2.0 %    IMMATURE GRANULOCYTES 0 0.0 - 5.0 %    ABS. NEUTROPHILS 1.8 1.7 - 8.2 K/UL    ABS. LYMPHOCYTES 2.7 0.5 - 4.6 K/UL    ABS. MONOCYTES 0.4 0.1 - 1.3 K/UL    ABS.  EOSINOPHILS 0.3 0.0 - 0.8 K/UL ABS. BASOPHILS 0.1 0.0 - 0.2 K/UL    ABS. IMM. GRANS. 0.0 0.0 - 0.5 K/UL   LIPID PANEL    Collection Time: 12/11/20  3:56 AM   Result Value Ref Range    LIPID PROFILE          Cholesterol, total 141 <200 MG/DL    Triglyceride 51 35 - 150 MG/DL    HDL Cholesterol 48 40 - 60 MG/DL    LDL, calculated 82.8 <100 MG/DL    VLDL, calculated 10.2 6.0 - 23.0 MG/DL    CHOL/HDL Ratio 2.9     HEPATIC FUNCTION PANEL    Collection Time: 12/11/20  3:56 AM   Result Value Ref Range    Protein, total 5.8 (L) 6.3 - 8.2 g/dL    Albumin 3.2 (L) 3.5 - 5.0 g/dL    Globulin 2.6 2.3 - 3.5 g/dL    A-G Ratio 1.2 1.2 - 3.5      Bilirubin, total 0.4 0.2 - 1.1 MG/DL    Bilirubin, direct 0.1 <0.4 MG/DL    Alk. phosphatase 68 50 - 136 U/L    AST (SGOT) 15 15 - 37 U/L    ALT (SGPT) 24 12 - 65 U/L   GLUCOSE, POC    Collection Time: 12/11/20 12:50 PM   Result Value Ref Range    Glucose (POC) 78 65 - 100 mg/dL   GLUCOSE, POC    Collection Time: 12/11/20  4:07 PM   Result Value Ref Range    Glucose (POC) 180 (H) 65 - 100 mg/dL       All Micro Results     None          Other Studies:  Xr Chest Pa Lat    Result Date: 12/10/2020  EXAMINATION: XR CHEST PA LAT  12/10/2020 12:05 PM ACCESSION NUMBER: [id number] COMPARISON: None available INDICATION: chest pain FINDINGS: Lungs: No infiltrate. No evidence of pleural effusion. Vasculature: Within normal limits. Cardiac: No cardiomegaly. Renu/Mediastinum:  No visible mass or adenopathy. Bones: No acute changes. Other:  No additional findings. IMPRESSION: 1. No acute abnormality. Duplex Carotid Bilateral    Result Date: 12/11/2020  EXAM: Ultrasound of the carotid arteries. INDICATION: Bruit. COMPARISON: None. TECHNIQUE: A standard protocol carotid ultrasound was performed, utilizing grayscale, color Doppler and duplex imaging. FINDINGS: There is mild soft plaque in both distal common and proximal internal carotid arteries, with stenosis below 50% bilaterally.  The peak right common carotid artery systolic velocity is 90.8 cm/s, with a diastolic velocity of 95.7 cm/s. The peak right internal carotid artery systolic velocity is 747.8 cm/s, with a diastolic velocity of 16.8 cm/s. The right ICA/CCA velocity ratio is 1.2. The peak left common carotid artery systolic velocity is 13.9 cm/s, with a diastolic velocity of 21.5 cm/s. The peak left internal carotid artery systolic velocity is 385.1 cm/s, with a diastolic velocity of 84.0 cm/s. The left ICA/CCA velocity ratio is 1.6. There is antegrade flow in both vertebral arteries. IMPRESSION: No evidence of hemodynamically significant stenosis in either carotid artery. Assessment and Plan:     Hospital Problems as of 12/11/2020 Never Reviewed          Codes Class Noted - Resolved POA    Unstable angina ([facility] ICD-10-CM: I20.0  ICD-9-CM: 411.1  12/11/2020 - Present Unknown        Chest pain ICD-10-CM: R07.9  ICD-9-CM: 786.50  12/10/2020 - Present Unknown        * (Principal) Coronary artery disease involving coronary bypass graft of native heart with unstable angina pectoris ([facility] ICD-10-CM: I25.700  ICD-9-CM: 414.05, 411.1  12/10/2020 - Present Yes        Insulin dependent type 2 diabetes mellitus ([facility] ICD-10-CM: E11.9, Z79.4  ICD-9-CM: 250.00, V58.67  12/10/2020 - Present Yes        Bilateral carotid bruits ICD-10-CM: R09.89  ICD-9-CM: 785.9  12/10/2020 - Present Yes        PAD (peripheral artery disease) ([facility]) ICD-10-CM: I73.9  ICD-9-CM: 443.9  12/10/2020 - Present Yes              PLAN:    --Type 1 diabetesutilize 15 units Lantus nightly starting tonight and continue sliding scale Humalog prandial coverage. Adjust insulin as needed.   Thank you for this interesting consult we will follow along    --Symptomatic three-vessel coronary artery diseaseCT surgical consultation pending    --Past history of peripheral vascular disease status post prior stenting on dual antiplatelet therapy, hyperlipidemia, smoker, hypertension and hypothyroidismcontinue home medications as appropriate    DC planning/Dispo: Home  DVT ppx: Per primary service          Signed:  Giuseppe Frost DO

## 2020-12-11 NOTE — DIABETES MGMT
Patient seen for insulin pump notification. Patient is alert and oriented x4. Admitting blood glucose 82 . HbA1c 8.3. Patient has a past medical history of PAD s/p stents, dyslipidemia, hypothyroidism, and nicotine use. Patient states they were diagnosed at age 16 yrs old with type 1 DM. Patient states they have a CGM to left side of abdomen. .Per patient they typically check blood glucose levels several times a day. Patient states they are currently using a Medtronic MiniMed 670G insulin pump for management of diabetes. Patient pump infusing Novolog 0.550 units/hr. Per pt pump site was last changed 3 days ago. Patient basal rates:  12am-7am: 0.650 units/hour. 7am-12am: 0.550 units/hour. Carb ratio: 25 g/U  Insulin sensitivity: 85 mg/dL  Total Basal/day= 13.9 units  Noted insulin pump agreement was signed and placed in patient medical chart. Patient states they do not have extra supplies for their insulin pump. Patient states last site change was 3 days ago for insulin pump infusion site and 7 days ago for CGM. Conchetta Peaks Patient verbalizes understanding that per hospital policy staff will check fingerstick blood glucose levels as ordered by provider. Patient also verbalizes understanding that they need to report bolus doses to primary RN for documentation. Patient has attended formal diabetes education in the past. Patient reports no difficulty with affording their diabetic supplies. Patient states [facility]. Educated patient to report any signs and symptoms of hypoglycemia to primary RN. Pt does not have any extra insulin pump supplies/insulin at bedside and both the insulin pump site and CGM are due to be changed. Pt will need to transition to subcutaneos insulin injections if they are not discharged today. Patient verbalizes understanding and voices no further questions regarding diabetes management at this time.

## 2020-12-11 NOTE — PROGRESS NOTES
BGL 78. Anabel Davenport NP notified of Pt.s BGL. Pt.s is non-symptomatic. Orders given to give Pt. Some juice to raise BGL. Will continue to monitor Pt.

## 2020-12-11 NOTE — PROGRESS NOTES
TRANSFER - OUT REPORT:    [facility] Jake  RRA  Diagnostic surgical consult  VErsed 2 mg  Fentanyl 25 mcg  Band 11 ml  No bleeding/hematoma    Verbal report given to dave(name) on Kayla Patel  being transferred to [place](unit) for routine progression of care       Report consisted of patients Situation, Background, Assessment and   Recommendations(SBAR). Information from the following report(s) SBAR and Procedure Summary was reviewed with the receiving nurse. Lines:   Peripheral IV 12/10/20 Left Forearm (Active)   Site Assessment Clean, dry, & intact 12/11/20 1210   Phlebitis Assessment 0 12/11/20 1210   Infiltration Assessment 0 12/11/20 1210   Dressing Status Clean, dry, & intact 12/11/20 1210   Dressing Type Tape;Transparent 12/11/20 1210   Hub Color/Line Status Patent 12/11/20 1210   Alcohol Cap Used No 12/11/20 1210       Peripheral IV 12/10/20 Anterior;Proximal;Right Forearm (Active)   Site Assessment Clean, dry, & intact 12/11/20 1210   Phlebitis Assessment 0 12/11/20 1210   Infiltration Assessment 0 12/11/20 1210   Dressing Status Clean, dry, & intact 12/11/20 1210   Dressing Type Tape;Transparent 12/11/20 1210   Hub Color/Line Status Patent 12/11/20 1210   Alcohol Cap Used No 12/11/20 1210       Subcutaneous Insulin Infusion Device 12/10/20 (Active)   Site Assessment Clean, dry, & intact 12/11/20 0742   Date of Last Set Change 12/08/20 12/11/20 0420   Dressing Status Clean, dry, & intact 12/11/20 0742   Pump continued on admission? Yes 12/11/20 0742   Patient able to care for pump? Yes 12/11/20 0742   Qualified caregiver available? Yes 12/11/20 0742   Extra supplies available? No 12/11/20 0002   Was pump agreement signed?  Yes 12/11/20 0742   Patient reported basal rate  0.1 12/10/20 1657   Usual carb ratio (per pt report) 25 12/10/20 1657   [place] volume at admission (mL) 0.25 mL 12/10/20 1657   BOLUS (in Units) given via pump 1.1 12/10/20 1657        Opportunity for questions and clarification was provided.

## 2020-12-11 NOTE — PROGRESS NOTES
TRANSFER - IN REPORT:    Verbal report received from [facility] on [facility] being received from Cath lab for routine progression of care. Report consisted of patients Situation, Background, Assessment and Recommendations(SBAR). Information from the following report(s) SBAR, Procedure Summary, Recent Results and Cardiac Rhythm NSR was reviewed. Opportunity for questions and clarification was provided. Assessment completed upon patients arrival to unit and care assumed. Patient received to room 327. Patient connected to monitor and assessment completed. Plan of care reviewed. Patient oriented to room and call light. Patient aware to use call light to communicate any chest pain or needs. Admission skin assessment completed with second RN and reveals the following: Pt. Has R-radial site with TR band, 11 mls of air in band.

## 2020-12-11 NOTE — PROCEDURES
Brief Cardiac Procedure Note    Patient: Tiffany Walsh MRN: [id number]  SSN: [id number]    YOB: 1960  Age: 63 y.o. Sex: male      Date of Procedure: 12/11/2020     Pre-procedure Diagnosis: Typical Angina    Post-procedure Diagnosis: Coronary Artery Disease    Reason for Procedure: ACS < or = 24 Hours    Procedure: Left Heart Catheterization    Brief Description of Procedure: lhc via right radial, tr    Performed By: Manuella Fabry, MD     Assistants: none    Anesthesia: Moderate Sedation    Estimated Blood Loss: Less than 10 mL      Specimens: None    Implants: None    Findings: 3 vessel cad, nl lvef    Complications: None    Recommendations: Continue medical therapy.     Signed By: Manuella Fabry, MD     December 11, 2020

## 2020-12-11 NOTE — PROGRESS NOTES
[facility] CARDIOLOGY PROGRESS NOTE    12/11/2020 4:12 PM    Admit Date: 12/10/2020        Subjective:   Stable overnight without angina, CHF, or palpitations. Vitals stable and controlled. No other complaints overnight. Tolerating meds well. Objective:      Vitals:    12/11/20 0447 12/11/20 0908 12/11/20 1253 12/11/20 1550   BP: (!) 105/57 (!) 104/53 (!) 108/57 120/60   Pulse: (!) 58 68 (!) 58 60   Resp: 16 17 18 16   Temp: 97.7 °F (36.5 °C) 98.5 °F (36.9 °C) 98.4 °F (36.9 °C)    SpO2: 97% 95% 93% 98%   Weight: 148 lb 4.8 oz (67.3 kg)      Height:           Physical Exam:  Neck- supple, no JVD  CV- regular rate and rhythm no MRG  Lung- clear bilaterally  Abd- soft, nontender, nondistended  Ext- no edema  Skin- warm and dry    Data Review:   Recent Labs     12/11/20  0356 12/10/20  1203    141   K 4.2 4.2   BUN 17 18   CREA 0.71* 0.75*   GLU 86 82   WBC 5.3 5.3   HGB 11.8* 11.8*   HCT 33.7* 34.2*    225   CHOL 141  --    TRIGL 51  --    HDL 48  --        Assessment and Plan:     Principal Problem:    Coronary artery disease involving coronary bypass graft of native heart with unstable angina pectoris (HCC) (12/10/2020)- CP resolved but cath with severe multivessel CAD and normal LV function. Needs CABG. CT surgery consulted. Active Problems:    Chest pain (12/10/2020)- resolved, heparin gtt if recurrent pre-CABG      Insulin dependent type 2 diabetes mellitus ([facility] (12/10/2020)- per protocol      Bilateral carotid bruits (12/10/2020)- no severe disease by duplex today, outpatient surveillance      PAD (peripheral artery disease) ([facility] (12/10/2020)- prior PPI- asymptomatic at present, hold plavix with washout pre-CABG    Stop plavix, BMP/CBC/Mg in AM    A.  Blanca Sheppard MD  [address] Cardiology  Pager [phone]

## 2020-12-11 NOTE — ROUTINE PROCESS
Bedside and Verbal report given to self by [facility]. Report included SBAR, Kardex, ED Summary, Procedure Summary, Intake and Output and Cardiac Rhythm NSR.

## 2020-12-11 NOTE — PROGRESS NOTES
Care Management Interventions  PCP Verified by CM: No(Pt lives in [place]. He plans to get a PCP there.)  Mode of Transport at Discharge: Other (see comment)(alena cherry  Other Relative    [phone] )  Transition of Care Consult (CM Consult): Discharge Planning  Discharge Durable Medical Equipment: No  Physical Therapy Consult: No  Occupational Therapy Consult: No  Current Support Network: Lives with Spouse, Own Home  Confirm Follow Up Transport: Family  Discharge Location  Discharge Placement: Home    Pt admitted to 3rd floor [facility] for chest pain. CM met with pt to discuss CM needs & DCP. Pt is A&Ox4. Pt is indep at home with all ADLS. Pt lives in [place] with his wife and 2 children but works at [employer] here in [place]. Pt has no DME needs. Pt is IDDM with [employer] with insulin pump. Pt has no difficulty with obtaining medications or transport. DCP home with spouse. No further needs noted.  CM to continue to monitor

## 2020-12-12 LAB
ANION GAP SERPL CALC-SCNC: 7 MMOL/L (ref 7–16)
BASOPHILS # BLD: 0.1 K/UL (ref 0–0.2)
BASOPHILS NFR BLD: 1 % (ref 0–2)
BUN SERPL-MCNC: 18 MG/DL (ref 6–23)
CALCIUM SERPL-MCNC: 8.4 MG/DL (ref 8.3–10.4)
CHLORIDE SERPL-SCNC: 107 MMOL/L (ref 98–107)
CO2 SERPL-SCNC: 27 MMOL/L (ref 21–32)
CREAT SERPL-MCNC: 0.77 MG/DL (ref 0.8–1.5)
DIFFERENTIAL METHOD BLD: ABNORMAL
EOSINOPHIL # BLD: 0.3 K/UL (ref 0–0.8)
EOSINOPHIL NFR BLD: 5 % (ref 0.5–7.8)
ERYTHROCYTE [DISTWIDTH] IN BLOOD BY AUTOMATED COUNT: 11.9 % (ref 11.9–14.6)
GLUCOSE BLD STRIP.AUTO-MCNC: 127 MG/DL (ref 65–100)
GLUCOSE BLD STRIP.AUTO-MCNC: 301 MG/DL (ref 65–100)
GLUCOSE BLD STRIP.AUTO-MCNC: 369 MG/DL (ref 65–100)
GLUCOSE BLD STRIP.AUTO-MCNC: 423 MG/DL (ref 65–100)
GLUCOSE BLD STRIP.AUTO-MCNC: 53 MG/DL (ref 65–100)
GLUCOSE BLD STRIP.AUTO-MCNC: 63 MG/DL (ref 65–100)
GLUCOSE SERPL-MCNC: 243 MG/DL (ref 65–100)
HCT VFR BLD AUTO: 34.2 % (ref 41.1–50.3)
HGB BLD-MCNC: 11.6 G/DL (ref 13.6–17.2)
IMM GRANULOCYTES # BLD AUTO: 0 K/UL (ref 0–0.5)
IMM GRANULOCYTES NFR BLD AUTO: 0 % (ref 0–5)
LYMPHOCYTES # BLD: 2.3 K/UL (ref 0.5–4.6)
LYMPHOCYTES NFR BLD: 42 % (ref 13–44)
MAGNESIUM SERPL-MCNC: 2 MG/DL (ref 1.8–2.4)
MCH RBC QN AUTO: 33.7 PG (ref 26.1–32.9)
MCHC RBC AUTO-ENTMCNC: 33.9 G/DL (ref 31.4–35)
MCV RBC AUTO: 99.4 FL (ref 79.6–97.8)
MONOCYTES # BLD: 0.4 K/UL (ref 0.1–1.3)
MONOCYTES NFR BLD: 8 % (ref 4–12)
NEUTS SEG # BLD: 2.4 K/UL (ref 1.7–8.2)
NEUTS SEG NFR BLD: 44 % (ref 43–78)
NRBC # BLD: 0 K/UL (ref 0–0.2)
PLATELET # BLD AUTO: 211 K/UL (ref 150–450)
PMV BLD AUTO: 10.2 FL (ref 9.4–12.3)
POTASSIUM SERPL-SCNC: 4.1 MMOL/L (ref 3.5–5.1)
RBC # BLD AUTO: 3.44 M/UL (ref 4.23–5.6)
SODIUM SERPL-SCNC: 141 MMOL/L (ref 136–145)
WBC # BLD AUTO: 5.4 K/UL (ref 4.3–11.1)

## 2020-12-12 PROCEDURE — 74011636637 HC RX REV CODE- 636/637: Performed by: PHYSICIAN ASSISTANT

## 2020-12-12 PROCEDURE — 83735 ASSAY OF MAGNESIUM: CPT

## 2020-12-12 PROCEDURE — 80048 BASIC METABOLIC PNL TOTAL CA: CPT

## 2020-12-12 PROCEDURE — 74011250637 HC RX REV CODE- 250/637: Performed by: INTERNAL MEDICINE

## 2020-12-12 PROCEDURE — 74011250637 HC RX REV CODE- 250/637: Performed by: PHYSICIAN ASSISTANT

## 2020-12-12 PROCEDURE — 74011250637 HC RX REV CODE- 250/637: Performed by: NURSE PRACTITIONER

## 2020-12-12 PROCEDURE — 65660000000 HC RM CCU STEPDOWN

## 2020-12-12 PROCEDURE — 36415 COLL VENOUS BLD VENIPUNCTURE: CPT

## 2020-12-12 PROCEDURE — 99232 SBSQ HOSP IP/OBS MODERATE 35: CPT | Performed by: INTERNAL MEDICINE

## 2020-12-12 PROCEDURE — 74011636637 HC RX REV CODE- 636/637: Performed by: INTERNAL MEDICINE

## 2020-12-12 PROCEDURE — 82962 GLUCOSE BLOOD TEST: CPT

## 2020-12-12 PROCEDURE — 85025 COMPLETE CBC W/AUTO DIFF WBC: CPT

## 2020-12-12 RX ORDER — INSULIN GLARGINE 100 [IU]/ML
20 INJECTION, SOLUTION SUBCUTANEOUS
Status: DISCONTINUED | OUTPATIENT
Start: 2020-12-12 | End: 2020-12-13

## 2020-12-12 RX ADMIN — INSULIN LISPRO 4 UNITS: 100 INJECTION, SOLUTION INTRAVENOUS; SUBCUTANEOUS at 08:21

## 2020-12-12 RX ADMIN — Medication 10 ML: at 22:07

## 2020-12-12 RX ADMIN — Medication 10 ML: at 05:17

## 2020-12-12 RX ADMIN — ATORVASTATIN CALCIUM 80 MG: 80 TABLET, FILM COATED ORAL at 08:21

## 2020-12-12 RX ADMIN — INSULIN LISPRO 3 UNITS: 100 INJECTION, SOLUTION INTRAVENOUS; SUBCUTANEOUS at 16:10

## 2020-12-12 RX ADMIN — Medication 5 ML: at 13:30

## 2020-12-12 RX ADMIN — Medication 5 ML: at 13:29

## 2020-12-12 RX ADMIN — METOPROLOL TARTRATE 12.5 MG: 25 TABLET, FILM COATED ORAL at 08:21

## 2020-12-12 RX ADMIN — LEVOTHYROXINE SODIUM 100 MCG: 0.1 TABLET ORAL at 05:16

## 2020-12-12 RX ADMIN — INSULIN GLARGINE 20 UNITS: 100 INJECTION, SOLUTION SUBCUTANEOUS at 22:05

## 2020-12-12 RX ADMIN — INSULIN LISPRO 3.5 UNITS: 100 INJECTION, SOLUTION INTRAVENOUS; SUBCUTANEOUS at 22:05

## 2020-12-12 RX ADMIN — METOPROLOL TARTRATE 12.5 MG: 25 TABLET, FILM COATED ORAL at 22:04

## 2020-12-12 RX ADMIN — ASPIRIN 81 MG CHEWABLE TABLET 81 MG: 81 TABLET CHEWABLE at 08:21

## 2020-12-12 NOTE — PROGRESS NOTES
Hospitalist Progress Note    2020  Admit Date: 12/10/2020 12:34 PM   NAME: Sp Galdamez   :  1960   MRN:  [id number]   Attending: Neil Kwok MD  PCP:  None    SUBJECTIVE:   61-year-old male with known peripheral vascular disease status post intervention with stenting, hypertension hypothyroidism hyperlipidemia and smoker. He is a type I diabetic with an insulin pump with approximately 14 units of basal insulin daily ranging from 0.65 units from 12 to 7 AM hours and 0.55 units/h remaining hours of the day. His hemoglobin A1c was 8.3%. His insulin pump was being managed but is about to run out and he needs orders for subcutaneous replacement coverage.     He presented with chest pain and has symptomatic three-vessel coronary artery disease by cardiac catheterization today. CT surgery consultation is pending. Home medications reviewed include aspirin Plavix Synthroid and not below. Interval History (): patient examined at bedside. No acute overnight events. No active chest pain or shortness of breath. No abdominal pain, nausea/vomiting, or diarrhea. Review of Systems negative with exception of pertinent positives noted above  PHYSICAL EXAM     Visit Vitals  /60 (BP 1 Location: Left arm, BP Patient Position: At rest)   Pulse (!) 59   Temp 97.6 °F (36.4 °C)   Resp 18   Ht 6' (1.829 m)   Wt 67.4 kg (148 lb 9.6 oz)   SpO2 97%   BMI 20.15 kg/m²      Temp (24hrs), Av.1 °F (36.7 °C), Min:97.6 °F (36.4 °C), Max:98.5 °F (36.9 °C)    Oxygen Therapy  O2 Sat (%): 97 % (20 1159)  Pulse via Oximetry: 70 beats per minute (12/10/20 1150)  O2 Device: Room air (20 1613)    Intake/Output Summary (Last 24 hours) at 2020 1622  Last data filed at 2020 1613  Gross per 24 hour   Intake 900 ml   Output    Net 900 ml      General: No acute distress    Lungs:  CTA Bilaterally.    Heart:  Regular rate and rhythm,  No murmur, rub, or gallop  Abdomen: Soft, Non distended, Non tender, Positive bowel sounds  Extremities: No cyanosis, clubbing or edema  Neurologic:  No focal deficits    ASSESSMENT      Active Hospital Problems    Diagnosis Date Noted    [facility] 12/11/2020    Type 1 diabetes mellitus with other specified complication ([facility] 77/08/2307    Chest pain 12/10/2020    Coronary artery disease involving coronary bypass graft of native heart with unstable angina pectoris ([facility] 12/10/2020    Bilateral carotid bruits 12/10/2020    PAD (peripheral artery disease) ([facility] 12/10/2020     Plan:    # DM type I  - increase Lantus from 15 to 20 units SQ qHS  - Humalog SSI and serial CBGs    # MVCAD  - scheduled for CABG after Plavix washout  - medical management per cardiology    Thank you for this consult. Hospitalist will follow along. Page/call with questions.      Signed By: Barbara Esparza,      December 12, 2020

## 2020-12-12 NOTE — CONSULTS
Consult    Patient: Milan Olszewski MRN: [id number]  SSN: [id number]    YOB: 1960  Age: 63 y.o. Sex: male      Subjective:      Milan Olszewski is a 63 y.o. male who is being seen for 3V CAD and ACS. 52-year-old male with known peripheral vascular disease status post intervention with stenting, IDDM (insulin pump), hypertension, hypothyroidism, hyperlipidemia, and smoking,. He presented with chest pain and has symptomatic three-vessel coronary artery disease by cardiac catheterization today. Patient has been on plavix; last dose was yesterday. Currently he is c/p-free on IV heparin,    Carotid duplex:  No sig disease.     [facility] Findings:    · The left main coronary artery is in normal anatomic position, trifurcates into LAD, ramus, and circumflex system. There is a 40% distal narrowing. · The LAD is a small diffusely diseased vessel throughout. There is a first diagonal branch with a 70% ostial narrowing and then a 90% narrowing in the midportion. There is a focal lesion in the mid LAD of 90%. · The ramus intermedius is a long large vessel, diffusely diseased proximally up to 80-90%. The distal vessel is free of significant disease. · The AV groove circumflex has two small distal obtuse marginal branches with a 75% proximal narrowing. · The right coronary artery is dominant vessel in normal anatomic position. There is 60-70% diffuse mid-vessel irregularities and there is a distal narrowing of 80% and then a 90% PDA lesion. · Left ventriculogram reveals normal LV systolic function. Ejection fraction is 60%. Left ventricular end-diastolic pressure is 13 mmHg with an opening aortic pressure 113/50. No gradient across the aortic valve.         Past Medical History:   Diagnosis Date    Diabetes ([address]     Thyroid disease      Past Surgical History:   Procedure Laterality Date    HX APPENDECTOMY      HX GI      HX ORTHOPAEDIC      broken heel    VASCULAR SURGERY PROCEDURE UNLIST stents to LSFA      No family history on file.   Social History     Tobacco Use    Smoking status: Never Smoker   Substance Use Topics    Alcohol use: Never     Frequency: Never      Current Facility-Administered Medications   Medication Dose Route Frequency Provider Last Rate Last Dose    dextrose (D50W) injection syrg 25 g  25 g IntraVENous PRN Delia Carter MD   25 g at 12/11/20 0505    levothyroxine (SYNTHROID) tablet 100 mcg  100 mcg Oral 6am Ml Ravi PA-C   100 mcg at 12/11/20 0910    insulin lispro (HUMALOG) injection   SubCUTAneous AC&HS Ml Ravi PA-C   3 Units at 12/11/20 2121    fentaNYL citrate (PF) injection  mcg   mcg IntraVENous Multiple Rosio Samson MD   25 mcg at 12/11/20 1536    heparin (PF) 2 units/ml in NS infusion  2 Units/hr IntraarTERial CONTINUOUS Rosio Samson MD 1 mL/hr at 12/11/20 1525 2 Units/hr at 12/11/20 1525    midazolam (VERSED) injection 0.5-2 mg  0.5-2 mg IntraVENous Multiple Rosio Samson MD   2 mg at 12/11/20 1536    0.9% sodium chloride infusion  75 mL/hr IntraVENous CONTINUOUS Rosio Samson MD 75 mL/hr at 12/11/20 1838 75 mL/hr at 12/11/20 1838    sodium chloride (NS) flush 5-40 mL  5-40 mL IntraVENous Q8H Rosio Samson MD   10 mL at 12/11/20 2122    sodium chloride (NS) flush 5-40 mL  5-40 mL IntraVENous PRN Rosoi Samson MD        atorvastatin (LIPITOR) tablet 80 mg  80 mg Oral DAILY Delia Carter MD        insulin glargine (LANTUS) injection 15 Units  15 Units SubCUTAneous QHS Wilma Calhoun DO   15 Units at 12/11/20 2121    sodium chloride (NS) flush 5-40 mL  5-40 mL IntraVENous Q8H Tonia NIETO NP   10 mL at 12/11/20 2122    sodium chloride (NS) flush 5-40 mL  5-40 mL IntraVENous PRN Margot Choudhury NP        aspirin chewable tablet 81 mg  81 mg Oral DAILY Margot Choudhury NP   81 mg at 12/11/20 0908    morphine injection 2 mg  2 mg IntraVENous Q4H PRN Margot Choudhury, NP  nitroglycerin (NITROSTAT) tablet 0.4 mg  0.4 mg SubLINGual Q5MIN PRN Raford Parcel, NP        0.9% sodium chloride infusion  75 mL/hr IntraVENous CONTINUOUS Raford Parcel, NP 75 mL/hr at 12/10/20 2339 75 mL/hr at 12/10/20 2339    metoprolol tartrate (LOPRESSOR) tablet 12.5 mg  12.5 mg Oral Q12H Maine Charleroi D, NP   12.5 mg at 12/11/20 2126        No Known Allergies    Review of Systems:  As per HPI. Objective:     Vitals:    12/11/20 1602 12/11/20 2102 12/11/20 2126 12/12/20 0032   BP: (!) 114/57 (!) 127/57  (!) 112/58   Pulse: 63 60 61 64   Resp: 16 18  17   Temp: 97.9 °F (36.6 °C) 98.5 °F (36.9 °C)  98.3 °F (36.8 °C)   SpO2: 95% 94%  95%   Weight:       Height:            Physical Exam:  Gen:  WDWN, NAD  Neuro:  A&O x 3, nonfocal.  Lung:  Clear  Cor:  RRR without m,r,g  Abd:  Normoactive bs, soft , nt  Ext:  Warm    Assessment:     Hospital Problems  Never Reviewed          Codes Class Noted POA    Unstable angina (HCC) ICD-10-CM: I20.0  ICD-9-CM: 411.1  12/11/2020 Unknown        Type 1 diabetes mellitus with other specified complication (HCC) [id number]: E10.69  ICD-9-CM: 250.81  12/11/2020 Unknown        Chest pain ICD-10-CM: R07.9  ICD-9-CM: 786.50  12/10/2020 Unknown        * (Principal) Coronary artery disease involving coronary bypass graft of native heart with unstable angina pectoris (HCC) ICD-10-CM: I25.700  ICD-9-CM: 414.05, 411.1  12/10/2020 Yes        Bilateral carotid bruits ICD-10-CM: R09.89  ICD-9-CM: 785.9  12/10/2020 Yes        PAD (peripheral artery disease) (HCC) ICD-10-CM: I73.9  ICD-9-CM: 443.9  12/10/2020 Yes              Plan:     The patient is 71-year-old male with known peripheral vascular disease status post intervention with stenting, IDDM (insulin pump), hypertension, hypothyroidism, hyperlipidemia, and smoking,. He presented with chest pain on 12/10 and has symptomatic three-vessel coronary artery disease by cardiac catheterization on 12/11.   Patient has been on chronic plavix; last dose was yesterday. Currently he is c/p-free on IV heparin. Patient is a candidate for CABG. Will order preop studies. Will need to wait for washout of plavix. Case will be scheduled for this week by Dr. Edvin Anderson.         Signed By: Gabi Horne MD     December 12, 2020

## 2020-12-12 NOTE — PROGRESS NOTES
Late afternoon blood sugar 369. Parameters are to give 4 units of Humalog and notify doctor. Dr. Gutierrez Vences notified. Orders to give 4 units and consult Hospitalist for Diabetes management.  RN to monitor progress

## 2020-12-12 NOTE — PROGRESS NOTES
[facility] CARDIOLOGY PROGRESS NOTE    12/12/2020 9:17 AM    Admit Date: 12/10/2020        Subjective:   Stable overnight without angina, CHF, or palpitations. Vitals stable and controlled. No other complaints overnight. Tolerating meds well. Objective:      Vitals:    12/11/20 2126 12/12/20 0032 12/12/20 0449 12/12/20 0821   BP:  (!) 112/58 138/62    Pulse: 61 64 65 62   Resp:  17 18    Temp:  98.3 °F (36.8 °C) 98 °F (36.7 °C)    SpO2:  95% 97%    Weight:   148 lb 9.6 oz (67.4 kg)    Height:           Physical Exam:  Neck- supple, no JVD  CV- regular rate and rhythm no MRG  Lung- clear bilaterally  Abd- soft, nontender, nondistended  Ext- no edema  Skin- warm and dry    Data Review:   Recent Labs     12/12/20  0429 12/11/20  0356    144   K 4.1 4.2   MG 2.0  --    BUN 18 17   CREA 0.77* 0.71*   * 86   WBC 5.4 5.3   HGB 11.6* 11.8*   HCT 34.2* 33.7*    205   CHOL  --  141   TRIGL  --  51   HDL  --  48       Assessment and Plan:     Principal Problem:    Coronary artery disease involving coronary bypass graft of native heart with unstable angina pectoris ([address] (12/10/2020)- severe MV CAD- heparin gtt if any more CP, CABG evaluation proceeding, BMP/CBC in AM.  plavix washout. Active Problems:    Chest pain (12/10/2020)- unstable angina- resolved, see above. Bilateral carotid bruits (12/10/2020)- mild bilateral disease on duplex      PAD (peripheral artery disease) ([address] (12/10/2020)- stable, continue meds- holding plavix      Unstable angina ([address] (12/11/2020)- resolved, see above. Type 1 diabetes mellitus with other specified complication ([address] (35/48/5416)- per protocol        EDDIE Ray MD  [address] Cardiology  Pager [phone]

## 2020-12-12 NOTE — PROGRESS NOTES
Afternoon blood sugar was 63. Patient states he normally eats his lunch at 1100. Philippe crackers, juice, and peanut butter provided. Blood sugar recheck revealed 127. Lunch tray at bedside. Patient eating with no difficulty.  RN to monitor patient progress

## 2020-12-12 NOTE — ROUTINE PROCESS
Bedside and Verbal shift change report to be given to self (oncoming nurse) by Emelyn Yuen RN (offgoing nurse). Report included the following information SBAR, Kardex, Procedure Summary, Intake/Output and MAR.

## 2020-12-12 NOTE — PROCEDURES
[facility]  CARDIAC CATH    Name:  Elsa Youssef  MR#:  [id number]  :  1960  ACCOUNT #:  [de-identified]  DATE OF SERVICE:  2020    PROCEDURES PERFORMED:  Left heart catheterization via the right radial artery, 5-Romanian Tiger catheter and angled pigtail. TR band was placed for good hemostasis. PREOPERATIVE DIAGNOSIS:  Chest pain consistent with Lebanese class 4 angina. POSTOPERATIVE DIAGNOSIS:  Coronary artery disease. SURGEON:  Ba Guan MD    ASSISTANT:  None. ESTIMATED BLOOD LOSS:  5 mL. SPECIMENS REMOVED:  None. COMPLICATIONS:  None. IMPLANTS:  None. ANESTHESIA:  Provided by El Moody RN beginning at 15:36, concluding at 15:50. A total of 2 mg Versed and 25 mcg fentanyl given. Vital signs and saturation were stable throughout. FINDINGS:  The left main coronary artery is in normal anatomic position, trifurcates into LAD, ramus, and circumflex system. There is a 40% distal narrowing. The LAD is a small diffusely diseased vessel throughout. There is a first diagonal branch with a 70% ostial narrowing and then a 90% narrowing in the midportion. There is a focal lesion in the mid LAD of 90%. The ramus intermedius is a long large vessel, diffusely diseased proximally up to 80-90%. The distal vessel is free of significant disease. The AV groove circumflex has two small distal obtuse marginal branches with a 75% proximal narrowing. The right coronary artery is dominant vessel in normal anatomic position. There is 60-70% diffuse mid-vessel irregularities and there is a distal narrowing of 80% and then a 90% PDA lesion. Left ventriculogram reveals normal LV systolic function. Ejection fraction is 60%. Left ventricular end-diastolic pressure is 13 mmHg with an opening aortic pressure 113/50. No gradient across the aortic valve.     CONCLUSION:  Severe multivessel coronary artery disease with preserved LV systolic function. RECOMMENDATIONS:  Consideration for bypass surgery.       Mata Hutchinson MD      [id number]  D:  12/11/2020 15:57  T:  12/12/2020 1:03  JOB #:  [id number]

## 2020-12-12 NOTE — ROUTINE PROCESS
Bedside and Verbal report given to [facility] by self. Report included SBAR, Kardex, ED summary, procedure summary, recent results and cardiac rhythm NSR.

## 2020-12-13 LAB
ANION GAP SERPL CALC-SCNC: 2 MMOL/L (ref 7–16)
BASOPHILS # BLD: 0 K/UL (ref 0–0.2)
BASOPHILS NFR BLD: 1 % (ref 0–2)
BUN SERPL-MCNC: 17 MG/DL (ref 6–23)
CALCIUM SERPL-MCNC: 8.5 MG/DL (ref 8.3–10.4)
CHLORIDE SERPL-SCNC: 107 MMOL/L (ref 98–107)
CO2 SERPL-SCNC: 29 MMOL/L (ref 21–32)
CREAT SERPL-MCNC: 0.91 MG/DL (ref 0.8–1.5)
DIFFERENTIAL METHOD BLD: ABNORMAL
EOSINOPHIL # BLD: 0.3 K/UL (ref 0–0.8)
EOSINOPHIL NFR BLD: 5 % (ref 0.5–7.8)
ERYTHROCYTE [DISTWIDTH] IN BLOOD BY AUTOMATED COUNT: 11.8 % (ref 11.9–14.6)
GLUCOSE BLD STRIP.AUTO-MCNC: 303 MG/DL (ref 65–100)
GLUCOSE BLD STRIP.AUTO-MCNC: 314 MG/DL (ref 65–100)
GLUCOSE BLD STRIP.AUTO-MCNC: 328 MG/DL (ref 65–100)
GLUCOSE BLD STRIP.AUTO-MCNC: 348 MG/DL (ref 65–100)
GLUCOSE BLD STRIP.AUTO-MCNC: 74 MG/DL (ref 65–100)
GLUCOSE SERPL-MCNC: 357 MG/DL (ref 65–100)
HCT VFR BLD AUTO: 34 % (ref 41.1–50.3)
HGB BLD-MCNC: 11.6 G/DL (ref 13.6–17.2)
IMM GRANULOCYTES # BLD AUTO: 0 K/UL (ref 0–0.5)
IMM GRANULOCYTES NFR BLD AUTO: 0 % (ref 0–5)
LYMPHOCYTES # BLD: 2.3 K/UL (ref 0.5–4.6)
LYMPHOCYTES NFR BLD: 40 % (ref 13–44)
MAGNESIUM SERPL-MCNC: 2.1 MG/DL (ref 1.8–2.4)
MCH RBC QN AUTO: 33.7 PG (ref 26.1–32.9)
MCHC RBC AUTO-ENTMCNC: 34.1 G/DL (ref 31.4–35)
MCV RBC AUTO: 98.8 FL (ref 79.6–97.8)
MONOCYTES # BLD: 0.5 K/UL (ref 0.1–1.3)
MONOCYTES NFR BLD: 9 % (ref 4–12)
NEUTS SEG # BLD: 2.6 K/UL (ref 1.7–8.2)
NEUTS SEG NFR BLD: 46 % (ref 43–78)
NRBC # BLD: 0 K/UL (ref 0–0.2)
PLATELET # BLD AUTO: 203 K/UL (ref 150–450)
PMV BLD AUTO: 10.1 FL (ref 9.4–12.3)
POTASSIUM SERPL-SCNC: 4.2 MMOL/L (ref 3.5–5.1)
RBC # BLD AUTO: 3.44 M/UL (ref 4.23–5.6)
SODIUM SERPL-SCNC: 138 MMOL/L (ref 136–145)
WBC # BLD AUTO: 5.7 K/UL (ref 4.3–11.1)

## 2020-12-13 PROCEDURE — 85025 COMPLETE CBC W/AUTO DIFF WBC: CPT

## 2020-12-13 PROCEDURE — 74011636637 HC RX REV CODE- 636/637: Performed by: INTERNAL MEDICINE

## 2020-12-13 PROCEDURE — 80048 BASIC METABOLIC PNL TOTAL CA: CPT

## 2020-12-13 PROCEDURE — 74011250637 HC RX REV CODE- 250/637: Performed by: INTERNAL MEDICINE

## 2020-12-13 PROCEDURE — 83735 ASSAY OF MAGNESIUM: CPT

## 2020-12-13 PROCEDURE — 74011250637 HC RX REV CODE- 250/637: Performed by: NURSE PRACTITIONER

## 2020-12-13 PROCEDURE — 65660000000 HC RM CCU STEPDOWN

## 2020-12-13 PROCEDURE — 99232 SBSQ HOSP IP/OBS MODERATE 35: CPT | Performed by: INTERNAL MEDICINE

## 2020-12-13 PROCEDURE — 36415 COLL VENOUS BLD VENIPUNCTURE: CPT

## 2020-12-13 PROCEDURE — 74011250637 HC RX REV CODE- 250/637: Performed by: PHYSICIAN ASSISTANT

## 2020-12-13 PROCEDURE — 82962 GLUCOSE BLOOD TEST: CPT

## 2020-12-13 PROCEDURE — 74011636637 HC RX REV CODE- 636/637: Performed by: PHYSICIAN ASSISTANT

## 2020-12-13 RX ORDER — INSULIN GLARGINE 100 [IU]/ML
30 INJECTION, SOLUTION SUBCUTANEOUS
Status: DISCONTINUED | OUTPATIENT
Start: 2020-12-13 | End: 2020-12-13

## 2020-12-13 RX ORDER — INSULIN GLARGINE 100 [IU]/ML
25 INJECTION, SOLUTION SUBCUTANEOUS
Status: DISCONTINUED | OUTPATIENT
Start: 2020-12-13 | End: 2020-12-14

## 2020-12-13 RX ADMIN — Medication 10 ML: at 21:56

## 2020-12-13 RX ADMIN — INSULIN LISPRO 4 UNITS: 100 INJECTION, SOLUTION INTRAVENOUS; SUBCUTANEOUS at 16:33

## 2020-12-13 RX ADMIN — METOPROLOL TARTRATE 12.5 MG: 25 TABLET, FILM COATED ORAL at 08:14

## 2020-12-13 RX ADMIN — Medication 5 ML: at 13:19

## 2020-12-13 RX ADMIN — METOPROLOL TARTRATE 12.5 MG: 25 TABLET, FILM COATED ORAL at 21:54

## 2020-12-13 RX ADMIN — INSULIN LISPRO 4 UNITS: 100 INJECTION, SOLUTION INTRAVENOUS; SUBCUTANEOUS at 08:14

## 2020-12-13 RX ADMIN — ASPIRIN 81 MG CHEWABLE TABLET 81 MG: 81 TABLET CHEWABLE at 08:14

## 2020-12-13 RX ADMIN — Medication 10 ML: at 06:26

## 2020-12-13 RX ADMIN — LEVOTHYROXINE SODIUM 100 MCG: 0.1 TABLET ORAL at 06:26

## 2020-12-13 RX ADMIN — ATORVASTATIN CALCIUM 80 MG: 80 TABLET, FILM COATED ORAL at 08:14

## 2020-12-13 RX ADMIN — INSULIN GLARGINE 25 UNITS: 100 INJECTION, SOLUTION SUBCUTANEOUS at 21:57

## 2020-12-13 RX ADMIN — INSULIN LISPRO 4 UNITS: 100 INJECTION, SOLUTION INTRAVENOUS; SUBCUTANEOUS at 21:56

## 2020-12-13 NOTE — PROGRESS NOTES
Hospitalist Progress Note    2020  Admit Date: 12/10/2020 12:34 PM   NAME: Farshad Newman   :  1960   MRN:  [id number]   Attending: Adrianne Carpenter MD  PCP:  None    SUBJECTIVE:   44-year-old male with known peripheral vascular disease status post intervention with stenting, hypertension hypothyroidism hyperlipidemia and smoker. He is a type I diabetic with an insulin pump with approximately 14 units of basal insulin daily ranging from 0.65 units from 12 to 7 AM hours and 0.55 units/h remaining hours of the day. His hemoglobin A1c was 8.3%. His insulin pump was being managed but is about to run out and he needs orders for subcutaneous replacement coverage.     He presented with chest pain and has symptomatic three-vessel coronary artery disease by cardiac catheterization today. CT surgery consultation is pending. Home medications reviewed include aspirin Plavix Synthroid and not below. Interval History (): patient examined at bedside. No acute overnight events. No active chest pain or shortness of breath. No abdominal pain, nausea/vomiting, or diarrhea. Review of Systems negative with exception of pertinent positives noted above  PHYSICAL EXAM     Visit Vitals  BP (!) 110/54 (BP 1 Location: Left arm, BP Patient Position: At rest)   Pulse 64   Temp 97.8 °F (36.6 °C)   Resp 18   Ht 6' (1.829 m)   Wt 67.4 kg (148 lb 9.6 oz)   SpO2 98%   BMI 20.15 kg/m²      Temp (24hrs), Av.5 °F (36.9 °C), Min:97.8 °F (36.6 °C), Max:98.9 °F (37.2 °C)    Oxygen Therapy  O2 Sat (%): 98 % (20 0814)  Pulse via Oximetry: 70 beats per minute (12/10/20 1150)  O2 Device: Room air (20 1157)    Intake/Output Summary (Last 24 hours) at 2020 1207  Last data filed at 2020 1157  Gross per 24 hour   Intake 640 ml   Output 0 ml   Net 640 ml      General: No acute distress    Lungs:  CTA Bilaterally.    Heart:  Regular rate and rhythm,  No murmur, rub, or gallop  Abdomen: Soft, Non distended, Non tender, Positive bowel sounds  Extremities: No cyanosis, clubbing or edema  Neurologic:  No focal deficits    ASSESSMENT      Active Hospital Problems    Diagnosis Date Noted    Unstable angina ([facility] 12/11/2020    Type 1 diabetes mellitus with other specified complication ([facility] 11/07/8238    Chest pain 12/10/2020    Coronary artery disease involving coronary bypass graft of native heart with unstable angina pectoris ([facility] 12/10/2020    Bilateral carotid bruits 12/10/2020    PAD (peripheral artery disease) ([facility] 12/10/2020     Plan:    # DM type I  - increase Lantus from 20 to 25 units SQ qHS  - Humalog SSI and serial CBGs    # MVCAD  - scheduled for CABG after Plavix washout  - cardiology added heparin gtt  - medical management per cardiology    Thank you for this consult. Hospitalist will follow along. Page/call with questions.      Signed By: Emerson Roe DO     December 13, 2020

## 2020-12-13 NOTE — PROGRESS NOTES
[facility] CARDIOLOGY PROGRESS NOTE    12/13/2020 10:28 AM    Admit Date: 12/10/2020        Subjective:   Stable overnight without CHF, or palpitations, but with mild CP/pressure ambulating halls last night. Holding plavix for washout, starting heparin gtt today. Vitals stable and controlled. No other complaints overnight. Tolerating meds well. Objective:      Vitals:    12/13/20 0058 12/13/20 0423 12/13/20 0426 12/13/20 0814   BP: 125/63 121/61  (!) 110/54   Pulse: 69 62  64   Resp: 14 16  18   Temp: 98.8 °F (37.1 °C) 98.8 °F (37.1 °C)  97.8 °F (36.6 °C)   SpO2: 96% 97%  98%   Weight:   148 lb 9.6 oz (67.4 kg)    Height:           Physical Exam:  Neck- supple, no JVD  CV- regular rate and rhythm no MRG  Lung- clear bilaterally  Abd- soft, nontender, nondistended  Ext- no edema  Skin- warm and dry    Data Review:   Recent Labs     12/13/20  0441 12/12/20  0429 12/11/20  0356    141 144   K 4.2 4.1 4.2   MG 2.1 2.0  --    BUN 17 18 17   CREA 0.91 0.77* 0.71*   * 243* 86   WBC 5.7 5.4 5.3   HGB 11.6* 11.6* 11.8*   HCT 34.0* 34.2* 33.7*    211 205   CHOL  --   --  141   TRIGL  --   --  51   HDL  --   --  48       Assessment and Plan:     Principal Problem:    Coronary artery disease involving coronary bypass graft of native heart with unstable angina pectoris ([facility] (12/10/2020)- severe MV CAD- heparin gtt since now having recurrent CP, CABG evaluation proceeding, BMP/CBC in AM.  Plavix washout.      Active Problems:    Chest pain (12/10/2020)- unstable angina- resolved, see above.   start heparin gtt now.        Bilateral carotid bruits (12/10/2020)- mild bilateral disease on duplex       PAD (peripheral artery disease) [address] (12/10/2020)- stable, continue meds- holding plavix- last dose 12/11/20       Unstable angina ([facility] (12/11/2020)- resolved, see above.        Type 1 diabetes mellitus with other specified complication ([facility] (87/43/9698)- per protocol    Heparin gtt now  Bmp and cbc in am  Continue current meds          A.  Shannan Conde MD  [address] Cardiology  Pager [phone]

## 2020-12-13 NOTE — ROUTINE PROCESS
Bedside and Verbal shift change report received from [facility] ANGÉLICA (offgoing nurse). Report included the following information SBAR, Kardex, Procedure Summary and Recent Results. Opportunity for questions provided.

## 2020-12-13 NOTE — ROUTINE PROCESS
Bedside and Verbal shift change report to be given to Winona Bernheim, RN (oncoming nurse) by aminata Lamas nurse). Report included the following information SBAR, Kardex, Procedure Summary, Intake/Output and MAR.

## 2020-12-13 NOTE — ROUTINE PROCESS
Verbal bedside report given to [facility] ishmael LINDSAY RN. Patient's situation, background, assessment and recommendations provided. Opportunity for questions provided. Oncoming RN assumed care of patient.

## 2020-12-14 ENCOUNTER — APPOINTMENT (OUTPATIENT)
Dept: GENERAL RADIOLOGY | Age: 60
DRG: 287 | End: 2020-12-14
Attending: THORACIC SURGERY (CARDIOTHORACIC VASCULAR SURGERY)
Payer: COMMERCIAL

## 2020-12-14 LAB
ALBUMIN SERPL-MCNC: 3.2 G/DL (ref 3.5–5)
ALBUMIN/GLOB SERPL: 1.1 {RATIO} (ref 1.2–3.5)
ALP SERPL-CCNC: 77 U/L (ref 50–136)
ALT SERPL-CCNC: 26 U/L (ref 12–65)
ANION GAP SERPL CALC-SCNC: 3 MMOL/L (ref 7–16)
ANION GAP SERPL CALC-SCNC: 3 MMOL/L (ref 7–16)
APTT PPP: 26.6 SEC (ref 24.1–35.1)
AST SERPL-CCNC: 16 U/L (ref 15–37)
BILIRUB SERPL-MCNC: 0.6 MG/DL (ref 0.2–1.1)
BUN SERPL-MCNC: 17 MG/DL (ref 6–23)
BUN SERPL-MCNC: 20 MG/DL (ref 6–23)
CALCIUM SERPL-MCNC: 8.4 MG/DL (ref 8.3–10.4)
CALCIUM SERPL-MCNC: 8.7 MG/DL (ref 8.3–10.4)
CHLORIDE SERPL-SCNC: 105 MMOL/L (ref 98–107)
CHLORIDE SERPL-SCNC: 109 MMOL/L (ref 98–107)
CO2 SERPL-SCNC: 30 MMOL/L (ref 21–32)
CO2 SERPL-SCNC: 30 MMOL/L (ref 21–32)
CREAT SERPL-MCNC: 0.71 MG/DL (ref 0.8–1.5)
CREAT SERPL-MCNC: 0.83 MG/DL (ref 0.8–1.5)
ERYTHROCYTE [DISTWIDTH] IN BLOOD BY AUTOMATED COUNT: 11.9 % (ref 11.9–14.6)
GLOBULIN SER CALC-MCNC: 2.8 G/DL (ref 2.3–3.5)
GLUCOSE BLD STRIP.AUTO-MCNC: 106 MG/DL (ref 65–100)
GLUCOSE BLD STRIP.AUTO-MCNC: 141 MG/DL (ref 65–100)
GLUCOSE BLD STRIP.AUTO-MCNC: 297 MG/DL (ref 65–100)
GLUCOSE BLD STRIP.AUTO-MCNC: 306 MG/DL (ref 65–100)
GLUCOSE BLD STRIP.AUTO-MCNC: 314 MG/DL (ref 65–100)
GLUCOSE BLD STRIP.AUTO-MCNC: 48 MG/DL (ref 65–100)
GLUCOSE BLD STRIP.AUTO-MCNC: 53 MG/DL (ref 65–100)
GLUCOSE SERPL-MCNC: 104 MG/DL (ref 65–100)
GLUCOSE SERPL-MCNC: 269 MG/DL (ref 65–100)
HCT VFR BLD AUTO: 34.3 % (ref 41.1–50.3)
HGB BLD-MCNC: 11.8 G/DL (ref 13.6–17.2)
INR PPP: 1
MAGNESIUM SERPL-MCNC: 2.1 MG/DL (ref 1.8–2.4)
MCH RBC QN AUTO: 33.9 PG (ref 26.1–32.9)
MCHC RBC AUTO-ENTMCNC: 34.4 G/DL (ref 31.4–35)
MCV RBC AUTO: 98.6 FL (ref 79.6–97.8)
NRBC # BLD: 0 K/UL (ref 0–0.2)
PLATELET # BLD AUTO: 217 K/UL (ref 150–450)
PMV BLD AUTO: 10.3 FL (ref 9.4–12.3)
POTASSIUM SERPL-SCNC: 3.8 MMOL/L (ref 3.5–5.1)
POTASSIUM SERPL-SCNC: 4.1 MMOL/L (ref 3.5–5.1)
PROT SERPL-MCNC: 6 G/DL (ref 6.3–8.2)
PROTHROMBIN TIME: 13.1 SEC (ref 12.5–14.7)
RBC # BLD AUTO: 3.48 M/UL (ref 4.23–5.6)
SODIUM SERPL-SCNC: 138 MMOL/L (ref 138–145)
SODIUM SERPL-SCNC: 142 MMOL/L (ref 138–145)
UFH PPP CHRO-ACNC: 0.4 IU/ML (ref 0.3–0.7)
WBC # BLD AUTO: 7.1 K/UL (ref 4.3–11.1)

## 2020-12-14 PROCEDURE — 74011250637 HC RX REV CODE- 250/637: Performed by: INTERNAL MEDICINE

## 2020-12-14 PROCEDURE — 85520 HEPARIN ASSAY: CPT

## 2020-12-14 PROCEDURE — 71046 X-RAY EXAM CHEST 2 VIEWS: CPT

## 2020-12-14 PROCEDURE — 74011250636 HC RX REV CODE- 250/636: Performed by: PHYSICIAN ASSISTANT

## 2020-12-14 PROCEDURE — 74011636637 HC RX REV CODE- 636/637: Performed by: INTERNAL MEDICINE

## 2020-12-14 PROCEDURE — 85027 COMPLETE CBC AUTOMATED: CPT

## 2020-12-14 PROCEDURE — 2709999900 HC NON-CHARGEABLE SUPPLY

## 2020-12-14 PROCEDURE — 74011250637 HC RX REV CODE- 250/637: Performed by: NURSE PRACTITIONER

## 2020-12-14 PROCEDURE — 80053 COMPREHEN METABOLIC PANEL: CPT

## 2020-12-14 PROCEDURE — 36415 COLL VENOUS BLD VENIPUNCTURE: CPT

## 2020-12-14 PROCEDURE — 74011250637 HC RX REV CODE- 250/637: Performed by: PHYSICIAN ASSISTANT

## 2020-12-14 PROCEDURE — 99232 SBSQ HOSP IP/OBS MODERATE 35: CPT | Performed by: INTERNAL MEDICINE

## 2020-12-14 PROCEDURE — 83735 ASSAY OF MAGNESIUM: CPT

## 2020-12-14 PROCEDURE — 82962 GLUCOSE BLOOD TEST: CPT

## 2020-12-14 PROCEDURE — 85610 PROTHROMBIN TIME: CPT

## 2020-12-14 PROCEDURE — 65660000000 HC RM CCU STEPDOWN

## 2020-12-14 PROCEDURE — 85730 THROMBOPLASTIN TIME PARTIAL: CPT

## 2020-12-14 PROCEDURE — 74011636637 HC RX REV CODE- 636/637: Performed by: PHYSICIAN ASSISTANT

## 2020-12-14 RX ORDER — SODIUM CHLORIDE 0.9 % (FLUSH) 0.9 %
5-40 SYRINGE (ML) INJECTION EVERY 8 HOURS
Status: DISCONTINUED | OUTPATIENT
Start: 2020-12-14 | End: 2020-12-14

## 2020-12-14 RX ORDER — INSULIN LISPRO 100 [IU]/ML
3 INJECTION, SOLUTION INTRAVENOUS; SUBCUTANEOUS ONCE
Status: COMPLETED | OUTPATIENT
Start: 2020-12-14 | End: 2020-12-14

## 2020-12-14 RX ORDER — INSULIN GLARGINE 100 [IU]/ML
18 INJECTION, SOLUTION SUBCUTANEOUS
Status: DISCONTINUED | OUTPATIENT
Start: 2020-12-14 | End: 2020-12-15

## 2020-12-14 RX ORDER — INSULIN LISPRO 100 [IU]/ML
2 INJECTION, SOLUTION INTRAVENOUS; SUBCUTANEOUS
Status: DISCONTINUED | OUTPATIENT
Start: 2020-12-14 | End: 2020-12-14

## 2020-12-14 RX ORDER — HEPARIN SODIUM 5000 [USP'U]/ML
60 INJECTION, SOLUTION INTRAVENOUS; SUBCUTANEOUS ONCE
Status: COMPLETED | OUTPATIENT
Start: 2020-12-14 | End: 2020-12-14

## 2020-12-14 RX ORDER — INSULIN LISPRO 100 [IU]/ML
2 INJECTION, SOLUTION INTRAVENOUS; SUBCUTANEOUS
Status: DISCONTINUED | OUTPATIENT
Start: 2020-12-14 | End: 2020-12-15 | Stop reason: HOSPADM

## 2020-12-14 RX ORDER — HEPARIN SODIUM 5000 [USP'U]/100ML
12-25 INJECTION, SOLUTION INTRAVENOUS
Status: DISCONTINUED | OUTPATIENT
Start: 2020-12-14 | End: 2020-12-15 | Stop reason: HOSPADM

## 2020-12-14 RX ORDER — SODIUM CHLORIDE 0.9 % (FLUSH) 0.9 %
5-40 SYRINGE (ML) INJECTION AS NEEDED
Status: DISCONTINUED | OUTPATIENT
Start: 2020-12-14 | End: 2020-12-15 | Stop reason: HOSPADM

## 2020-12-14 RX ADMIN — METOPROLOL TARTRATE 12.5 MG: 25 TABLET, FILM COATED ORAL at 21:19

## 2020-12-14 RX ADMIN — Medication 5 ML: at 06:46

## 2020-12-14 RX ADMIN — Medication 10 ML: at 21:20

## 2020-12-14 RX ADMIN — INSULIN LISPRO 4 UNITS: 100 INJECTION, SOLUTION INTRAVENOUS; SUBCUTANEOUS at 17:00

## 2020-12-14 RX ADMIN — INSULIN GLARGINE 18 UNITS: 100 INJECTION, SOLUTION SUBCUTANEOUS at 22:19

## 2020-12-14 RX ADMIN — HEPARIN SODIUM 4050 UNITS: 5000 INJECTION INTRAVENOUS; SUBCUTANEOUS at 11:36

## 2020-12-14 RX ADMIN — Medication 10 ML: at 12:54

## 2020-12-14 RX ADMIN — METOPROLOL TARTRATE 12.5 MG: 25 TABLET, FILM COATED ORAL at 09:32

## 2020-12-14 RX ADMIN — Medication 10 ML: at 21:21

## 2020-12-14 RX ADMIN — LEVOTHYROXINE SODIUM 100 MCG: 0.1 TABLET ORAL at 06:46

## 2020-12-14 RX ADMIN — HEPARIN SODIUM 12 UNITS/KG/HR: 5000 INJECTION, SOLUTION INTRAVENOUS at 11:28

## 2020-12-14 RX ADMIN — ASPIRIN 81 MG CHEWABLE TABLET 81 MG: 81 TABLET CHEWABLE at 09:33

## 2020-12-14 RX ADMIN — INSULIN LISPRO 3 UNITS: 100 INJECTION, SOLUTION INTRAVENOUS; SUBCUTANEOUS at 12:50

## 2020-12-14 RX ADMIN — ATORVASTATIN CALCIUM 80 MG: 80 TABLET, FILM COATED ORAL at 09:33

## 2020-12-14 RX ADMIN — Medication 10 ML: at 12:55

## 2020-12-14 NOTE — DIABETES MGMT
Noted most recent FSBS 297. Provider updated as patient glucose has been very labile. New orders received to hold prandial and SSI and give a one time dose of Humalog 3 units, primary RN updated.

## 2020-12-14 NOTE — ROUTINE PROCESS
Bedside and Verbal shift change report to be given to self (oncoming nurse) by Amador Wilkes RN (offgoing nurse). Report included the following information SBAR, Kardex, Procedure Summary, Intake/Output and MAR.

## 2020-12-14 NOTE — PROGRESS NOTES
0354 Patient c/o shakiness. Requested to have blood checked. BS 41. Apple juice, crackers, and peanut butter given. Felt better immediately after drinking juice. RN to monitor. Maria Esther arrived. Pt ate all on tray. 0850 BS recheck 141.  RN to  monitor

## 2020-12-14 NOTE — PROGRESS NOTES
Spiritual Care Visit, initial visit. Visited with patient at bedside. Patient stated that he expects to have Quadruple Bypass later, possibly this week    He requested a large print Bible. Prayed for patient's healing and health. Visit by Elfreda Nissen Maida Drummer, Staff .  M.Ed., Th.B., B.A.

## 2020-12-14 NOTE — PROGRESS NOTES
Afternoon . Wanda Levin, [facility], notified. Orders rec'd from Dr. Giselle Lynne to hold prandial and SSI. New order for one time dose of Humalog 3 units. RN to monitor patient progress.

## 2020-12-14 NOTE — ROUTINE PROCESS
Bedside and Verbal shift change report received from [place] (offgoing nurse). Report included the following information SBAR, Kardex, Procedure Summary and Recent Results. Opportunity for questions provided.

## 2020-12-14 NOTE — PROGRESS NOTES
CTS-pt denies any current chest pain or dyspnea. He states his physicians are affiliated with [facility] and he will go to his sister's residence in [facility] after surgery. He is unsure if he should have surgery here at [facility] vs being transferred to [facility]. He states it would be difficult to have any follow up here in [place] post op. 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 Will discuss with cardiology and case management to determine plan.      Dat Camacho PA-C

## 2020-12-14 NOTE — PROGRESS NOTES
Hospitalist Progress Note    2020  Admit Date: 12/10/2020 12:34 PM   NAME: Natalie Harrison   :  1960   MRN:  [id number]   Attending: Tesfaye Farooq MD  PCP:  None    SUBJECTIVE:   81-year-old male with known peripheral vascular disease status post intervention with stenting, hypertension hypothyroidism hyperlipidemia and smoker. He is a type I diabetic with an insulin pump with approximately 14 units of basal insulin daily ranging from 0.65 units from 12 to 7 AM hours and 0.55 units/h remaining hours of the day. His hemoglobin A1c was 8.3%. His insulin pump was being managed but is about to run out and he needs orders for subcutaneous replacement coverage.     He presented with chest pain and has symptomatic three-vessel coronary artery disease by cardiac catheterization today. CT surgery consultation is pending. Home medications reviewed include aspirin Plavix Synthroid and not below. Interval History (): patient examined at bedside. No acute overnight events. No active chest pain or shortness of breath. No abdominal pain, nausea/vomiting, or diarrhea.  Patient unsure if he wants to have CABG performed here or at home in [facility] (travels to [place] for work only)    Review of Systems negative with exception of pertinent positives noted above  PHYSICAL EXAM     Visit Vitals  BP (!) 112/54 (BP 1 Location: Left arm, BP Patient Position: At rest)   Pulse 63   Temp 98.2 °F (36.8 °C)   Resp 18   Ht 6' (1.829 m)   Wt 67.4 kg (148 lb 9.6 oz)   SpO2 95%   BMI 20.15 kg/m²      Temp (24hrs), Av °F (36.7 °C), Min:97.4 °F (36.3 °C), Max:98.3 °F (36.8 °C)    Oxygen Therapy  O2 Sat (%): 95 % (20 1230)  Pulse via Oximetry: 70 beats per minute (12/10/20 1150)  O2 Device: Room air (20 1138)    Intake/Output Summary (Last 24 hours) at 2020 1350  Last data filed at 2020 1138  Gross per 24 hour   Intake 740 ml   Output 0 ml   Net 740 ml      General: No acute distress    Lungs:  CTA Bilaterally. Heart:  Regular rate and rhythm,  No murmur, rub, or gallop  Abdomen: Soft, Non distended, Non tender, Positive bowel sounds  Extremities: No cyanosis, clubbing or edema  Neurologic:  No focal deficits    ASSESSMENT      Active Hospital Problems    Diagnosis Date Noted    Unstable angina ([facility] 12/11/2020    Type 1 diabetes mellitus with other specified complication ([facility] 31/87/1810    Chest pain 12/10/2020    Coronary artery disease involving coronary bypass graft of native heart with unstable angina pectoris ([facility] 12/10/2020    Bilateral carotid bruits 12/10/2020    [facility]peripheral artery disease) [facility] 12/10/2020     Plan:    # DM type I  - has very brittle and labile blood sugars  - decrease Lantus from 25 units to 18 units  - Humalog 3 units TIDAC  - Humalog SSI and serial CBGs    # MVCAD  - scheduled for CABG after Plavix washout  - cardiology added heparin gtt  - medical management per cardiology  - ? CABG at home in [facility] rather than in [place], [place] following    Thank you for this consult. Hospitalist will follow along. Page/call with questions.      Signed By: Burt Epley, DO     December 14, 2020

## 2020-12-14 NOTE — DIABETES MGMT
Patient's heart cath was positive for multivessel disease. Patient was a CABG consult. CTS working on plan for surgery. Patient's blood glucose labile over the weekend. Blood glucose ranged  yesterday with patient receiving Lantus 25 units and Humalog 12 units. Blood glucose 53 at 0128 and  48 at 812 this morning. Recheck was 141. Spoke with provider and plan is to reduce Lantus to 18 units and start Humalog 2 units with meals. Patient states he typically takes 1-2 units with meals. Will follow along to assist with titration.

## 2020-12-14 NOTE — ROUTINE PROCESS
Verbal bedside report given to [facility] ishmael LINDSAY RN. Patient's situation, background, assessment and recommendations provided. Opportunity for questions provided. Oncoming RN assumed care of patient.

## 2020-12-14 NOTE — PROGRESS NOTES
CM asked by Rebeca Segura with [facility] to inquire about possible transfer of pt to [facility] in [facility]. CM spoke with multiple operators on a few phone calls to [employer] CM informed by Bing Resendez at [employer] that [facility] is in network with pt's insurance. CM was told that pt did not need precert for the ambulance transport. CM informed pt had met deductible and [employer] would pay 80% with pt being responsible for 20% until pt met max out of pocket. CM discussed with pt. He states his mom and sister would like to take care of him after surgery. Pt then asks this CM if surgery could be done in [facility] as this is where his sister lives. Pt later walked over to this CM's desk and said \"never mind\" he would go to [facility]. CM awaiting timing of transfer and receiving physician. Pt under Cardiology service.

## 2020-12-14 NOTE — PROGRESS NOTES
Bedside and Verbal shift change report to be given to Norma Bobo RN (oncoming nurse) by self Rolando march). Report included the following information SBAR, Kardex, Procedure Summary, Intake/Output and MAR.

## 2020-12-15 VITALS
BODY MASS INDEX: 20.07 KG/M2 | OXYGEN SATURATION: 97 % | RESPIRATION RATE: 18 BRPM | HEIGHT: 72 IN | SYSTOLIC BLOOD PRESSURE: 104 MMHG | DIASTOLIC BLOOD PRESSURE: 55 MMHG | HEART RATE: 57 BPM | WEIGHT: 148.2 LBS | TEMPERATURE: 97.4 F

## 2020-12-15 LAB
ANION GAP SERPL CALC-SCNC: 3 MMOL/L (ref 7–16)
BUN SERPL-MCNC: 17 MG/DL (ref 6–23)
CALCIUM SERPL-MCNC: 8.2 MG/DL (ref 8.3–10.4)
CHLORIDE SERPL-SCNC: 104 MMOL/L (ref 98–107)
CO2 SERPL-SCNC: 32 MMOL/L (ref 21–32)
CREAT SERPL-MCNC: 0.98 MG/DL (ref 0.8–1.5)
ERYTHROCYTE [DISTWIDTH] IN BLOOD BY AUTOMATED COUNT: 11.9 % (ref 11.9–14.6)
GLUCOSE BLD STRIP.AUTO-MCNC: 127 MG/DL (ref 65–100)
GLUCOSE BLD STRIP.AUTO-MCNC: 304 MG/DL (ref 65–100)
GLUCOSE SERPL-MCNC: 410 MG/DL (ref 65–100)
HCT VFR BLD AUTO: 33 % (ref 41.1–50.3)
HGB BLD-MCNC: 11.4 G/DL (ref 13.6–17.2)
MCH RBC QN AUTO: 34.1 PG (ref 26.1–32.9)
MCHC RBC AUTO-ENTMCNC: 34.5 G/DL (ref 31.4–35)
MCV RBC AUTO: 98.8 FL (ref 79.6–97.8)
NRBC # BLD: 0 K/UL (ref 0–0.2)
PLATELET # BLD AUTO: 201 K/UL (ref 150–450)
PMV BLD AUTO: 10.4 FL (ref 9.4–12.3)
POTASSIUM SERPL-SCNC: 4.1 MMOL/L (ref 3.5–5.1)
RBC # BLD AUTO: 3.34 M/UL (ref 4.23–5.6)
SODIUM SERPL-SCNC: 139 MMOL/L (ref 136–145)
UFH PPP CHRO-ACNC: 0.28 IU/ML (ref 0.3–0.7)
UFH PPP CHRO-ACNC: 0.38 IU/ML (ref 0.3–0.7)
WBC # BLD AUTO: 6 K/UL (ref 4.3–11.1)

## 2020-12-15 PROCEDURE — 74011636637 HC RX REV CODE- 636/637: Performed by: PHYSICIAN ASSISTANT

## 2020-12-15 PROCEDURE — 36415 COLL VENOUS BLD VENIPUNCTURE: CPT

## 2020-12-15 PROCEDURE — 85520 HEPARIN ASSAY: CPT

## 2020-12-15 PROCEDURE — 74011250637 HC RX REV CODE- 250/637: Performed by: PHYSICIAN ASSISTANT

## 2020-12-15 PROCEDURE — 74011250637 HC RX REV CODE- 250/637: Performed by: INTERNAL MEDICINE

## 2020-12-15 PROCEDURE — 82962 GLUCOSE BLOOD TEST: CPT

## 2020-12-15 PROCEDURE — 99238 HOSP IP/OBS DSCHRG MGMT 30/<: CPT | Performed by: INTERNAL MEDICINE

## 2020-12-15 PROCEDURE — 80048 BASIC METABOLIC PNL TOTAL CA: CPT

## 2020-12-15 PROCEDURE — 85027 COMPLETE CBC AUTOMATED: CPT

## 2020-12-15 PROCEDURE — 74011250636 HC RX REV CODE- 250/636: Performed by: INTERNAL MEDICINE

## 2020-12-15 PROCEDURE — 74011250637 HC RX REV CODE- 250/637: Performed by: NURSE PRACTITIONER

## 2020-12-15 RX ORDER — HEPARIN SODIUM 5000 [USP'U]/ML
20 INJECTION, SOLUTION INTRAVENOUS; SUBCUTANEOUS ONCE
Status: COMPLETED | OUTPATIENT
Start: 2020-12-15 | End: 2020-12-15

## 2020-12-15 RX ORDER — ATORVASTATIN CALCIUM 80 MG/1
80 TABLET, FILM COATED ORAL DAILY
Qty: 30 TAB | Refills: 5 | Status: SHIPPED | OUTPATIENT
Start: 2020-12-16

## 2020-12-15 RX ORDER — INSULIN LISPRO 100 [IU]/ML
INJECTION, SOLUTION INTRAVENOUS; SUBCUTANEOUS
Qty: 1 VIAL | Refills: 3 | Status: SHIPPED
Start: 2020-12-15

## 2020-12-15 RX ORDER — METOPROLOL TARTRATE 25 MG/1
12.5 TABLET, FILM COATED ORAL EVERY 12 HOURS
Qty: 60 TAB | Refills: 5 | Status: SHIPPED | OUTPATIENT
Start: 2020-12-15

## 2020-12-15 RX ORDER — INSULIN GLARGINE 100 [IU]/ML
INJECTION, SOLUTION SUBCUTANEOUS
Qty: 1 VIAL | Refills: 0 | Status: SHIPPED
Start: 2020-12-15

## 2020-12-15 RX ORDER — HEPARIN SODIUM 5000 [USP'U]/100ML
12-25 INJECTION, SOLUTION INTRAVENOUS
Qty: 250 ML | Refills: 0 | Status: SHIPPED
Start: 2020-12-15

## 2020-12-15 RX ORDER — INSULIN GLARGINE 100 [IU]/ML
21 INJECTION, SOLUTION SUBCUTANEOUS
Status: DISCONTINUED | OUTPATIENT
Start: 2020-12-15 | End: 2020-12-15 | Stop reason: HOSPADM

## 2020-12-15 RX ORDER — INSULIN LISPRO 100 [IU]/ML
INJECTION, SOLUTION INTRAVENOUS; SUBCUTANEOUS
Qty: 1 VIAL | Refills: 0 | Status: SHIPPED
Start: 2020-12-15

## 2020-12-15 RX ORDER — NITROGLYCERIN 0.4 MG/1
0.4 TABLET SUBLINGUAL
Qty: 1 BOTTLE | Refills: 5 | Status: SHIPPED | OUTPATIENT
Start: 2020-12-15

## 2020-12-15 RX ADMIN — LEVOTHYROXINE SODIUM 100 MCG: 0.1 TABLET ORAL at 05:19

## 2020-12-15 RX ADMIN — Medication 10 ML: at 05:21

## 2020-12-15 RX ADMIN — HEPARIN SODIUM 1350 UNITS: 5000 INJECTION INTRAVENOUS; SUBCUTANEOUS at 02:54

## 2020-12-15 RX ADMIN — METOPROLOL TARTRATE 12.5 MG: 25 TABLET, FILM COATED ORAL at 08:14

## 2020-12-15 RX ADMIN — ATORVASTATIN CALCIUM 80 MG: 80 TABLET, FILM COATED ORAL at 08:14

## 2020-12-15 RX ADMIN — INSULIN LISPRO 4 UNITS: 100 INJECTION, SOLUTION INTRAVENOUS; SUBCUTANEOUS at 07:31

## 2020-12-15 RX ADMIN — ASPIRIN 81 MG CHEWABLE TABLET 81 MG: 81 TABLET CHEWABLE at 08:14

## 2020-12-15 NOTE — ROUTINE PROCESS
TRANSFER - OUT REPORT: 
 
Verbal report given to FARHAD Alves(name) on Arnoldo Mata  being transferred to [facility] in [facility] for routine progression of care Report consisted of patients Situation, Background, Assessment and  
Recommendations(SBAR). Information from the following report(s) SBAR, Kardex, Procedure Summary, MAR and Cardiac Rhythm SR/SB was reviewed with the receiving nurse. Lines:  
Peripheral IV 12/10/20 Left Forearm (Active) Site Assessment Clean, dry, & intact 12/14/20 2030 Phlebitis Assessment 0 12/14/20 2030 Infiltration Assessment 0 12/14/20 2030 Dressing Status Clean, dry, & intact 12/14/20 2030 Dressing Type Tape;Transparent 12/14/20 2030 Hub Color/Line Status Patent; Flushed 12/14/20 2030 Alcohol Cap Used No 12/14/20 0450 Peripheral IV 12/10/20 Anterior;Proximal;Right Forearm (Active) Site Assessment Clean, dry, & intact 12/14/20 2030 Phlebitis Assessment 0 12/14/20 2030 Infiltration Assessment 0 12/14/20 2030 Dressing Status Clean, dry, & intact 12/14/20 2030 Dressing Type Tape;Transparent 12/14/20 2030 Hub Color/Line Status Patent; Flushed 12/14/20 2030 Alcohol Cap Used No 12/14/20 0450 Subcutaneous Insulin Infusion Device 12/10/20 (Active) Site Assessment Clean, dry, & intact 12/12/20 0442 Date of Last Set Change 12/08/20 12/12/20 5282 Dressing Status Clean, dry, & intact 12/12/20 0442 Pump continued on admission? Yes 12/11/20 0742 Patient able to care for pump? Yes 12/11/20 0742 Qualified caregiver available? Yes 12/11/20 0742 Extra supplies available? No 12/11/20 0002 Was pump agreement signed? Yes 12/11/20 0742 Patient reported basal rate  0.1 12/10/20 1657 Usual carb ratio (per pt report) 25 12/10/20 1657 [place] volume at admission (mL) 0.25 mL 12/10/20 1657 BOLUS (in Units) given via pump 1.1 12/10/20 1657 Opportunity for questions and clarification was provided. Patient awaiting EMS transport to [facility].

## 2020-12-15 NOTE — ROUTINE PROCESS
Bedside and Verbal report given to self by Lino Powers RN. Report included SBAR, Kardex, ED Summary, Procedure Summary, Intake and Output and Cardiac Rhythm.

## 2020-12-15 NOTE — DIABETES MGMT
Patient admitted with CAD planned transfer to hospital in [facility] today. Blood glucose ranged  yesterday with patient receiving Lantus 18 units and Humalog 7 units. Blood glucose this morning was 304. Reviewed patient current regimen: Lantus 18 units daily, Humalog 2 units with meals, and Humalog sensitive sliding scale. Updated provider via [employer] regarding patient glycemic control. Patient would likely benefit from a slight increase in basal insulin as fasting blood glucose is not at goal. Provider plans to adjust regimen.

## 2020-12-15 NOTE — ROUTINE PROCESS
Verbal bedside report given to Cristiano Vines oncoming RN. Patient's situation, background, assessment and recommendations provided. Opportunity for questions provided. Oncoming RN assumed care of patient. Heparin IV drip verified at bedside with oncoming RN. R radial site visualized.

## 2020-12-15 NOTE — PROGRESS NOTES
Care Management Interventions  PCP Verified by CM: No(Pt lives in [facility]. He plans to get a PCP there.)  Mode of Transport at Discharge: Other (see comment)(alena cherry  Other Relative    [phone] )  Transition of Care Consult (CM Consult): Discharge Planning, Other  Discharge Durable Medical Equipment: No  Physical Therapy Consult: No  Occupational Therapy Consult: No  Current Support Network: Lives with Spouse, Own Home  Confirm Follow Up Transport: Family  The Plan for Transition of Care is Related to the Following Treatment Goals : Hospital transfer to in network facility   The Patient and/or Patient Representative was Provided with a Choice of Provider and Agrees with the Discharge Plan?: Yes  Name of the Patient Representative Who was Provided with a Choice of Provider and Agrees with the Discharge Plan: Patient  Freedom of Choice List was Provided with Basic Dialogue that Supports the Patient's Individualized Plan of Care/Goals, Treatment Preferences and Shares the Quality Data Associated with the Providers?: Yes   Resource Information Provided?: No  Discharge Location  Discharge Placement: Other:([address])    CM received confirmation of hospital to hospital transfer of pt for CABG at [facility]. Dr Lashaun Muir accepting physician. Pt plans to stay with his sister and mother in [facility] after his surgery. Pt agreeable to transfer via [facility] ambulance to [facility]- room 267. Disc of [facility] and ECHO in packet. CM communicated with transfer line of tentative transport time of noon. No other CM needs at this time.

## 2020-12-15 NOTE — DISCHARGE SUMMARY
[facility] Cardiology Discharge Summary     Patient ID:  Marie Leon  [id number]  89 y.o.  1960    Admit date: 12/10/2020    Discharge date:  . Mark Harris 12/15/20    Admitting Physician: Gage Khan MD     Discharge Physician: Moncho Hull NP/Dr. Bogdan Vicente    Admission Diagnoses: Chest pain [R07.9]  Unstable angina [facility]) [I20.0]    Discharge Diagnoses:   Patient Active Problem List    Diagnosis Date Noted    Unstable angina ([facility] [address] 12/11/2020    Type 1 diabetes mellitus with other specified complication ([facility] [address] 97/48/4705    Chest pain 12/10/2020    Coronary artery disease involving coronary bypass graft of native heart with unstable angina pectoris ([facility] [address] 12/10/2020    Bilateral carotid bruits 12/10/2020    PAD (peripheral artery disease) ([facility] [address] 12/10/2020       Cardiology Procedures this admission:  Diagnostic left heart catheterization  EchoCardiogram, Carotid US  Consults: None, Hospitalist and Cardiac Surgery     Hospital Course: Patient was seen in the ED of [facility] for complaints of CP/SOB. 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 The patient was given ASA and treated for agina prior to a LHC at [facility] on 12/11/2020. Patient underwent cardiac catheterization by Dr. Joseph Cruz. Patient was found to have a severe multivessel CAD that will required CABG but preserved EF. Noted vessels included 70% ostial diaginal with 90% blockage in the mid portion as well. Focal mid LAD lesion of 90%. Ramus with diffusely disease proximally up to 80-90%. OM with a 75% proximal narrowing. In the right system the patient had a 60-70% mid vessel irregularities with distal 80% lesion. The patients PDA had a 90% lesion as well. Patient tolerated the procedure well and was taken to the telemetry floor for recovery and started on a heparin drip for a plavix washout.  The following morning patient was up feeling well without any complaints of chest pain or shortness of breath. Echo showed:       -  Left ventricle: Systolic function was normal. Ejection fraction was  estimated in the range of 55 % to 60 %. There were no regional wall motion  abnormalities. Avg. E/e': 9.07. Left ventricular diastolic function   parameters  were normal.     -  Inferior vena cava, hepatic veins: The respirophasic change in diameter   was  more than 50%.    -  Aortic valve: The valve was trileaflet. Leaflets exhibited mild sclerosis. Duplex Carotid: No evidence of significant stenosis. Hospital Medicine was consulted for management DM. The patient was started on 15 then increased to 21 units Lantus nightly with SS Humalog prandial coverage. Due to the patients insurance the patient is not able to have CABG at our facility and will need to be transferred to receive appropriate treatment where he can have monitored follow up. Patient's right radial cath site was clean, dry and intact without hematoma or bruit. Patient's labs were WNL. Patient was seen and examined by Dr. Jo Ann Raymundo and determined stable and ready transfer to [facility] to Dr Kenny Sport service. Patient was instructed on the importance of medication compliance including taking ASA. For maximized medical therapy for CAD, patient will continue Beta Blocker and Statin as well. Which may be changed by receiving facility. The patient will follow up with his CV surgeon and primary cardiologist post procedure. Medical records and Imaging disk have been provided in transfer packet. DISPOSITION: The patient is being discharged home in stable condition on a low saturated fat, low cholesterol and low salt diet. The patient is instructed to advance activities as tolerated to the limit of fatigue or shortness of breath. The patient is instructed to avoid all heavy lifting for 5 days.  The patient is instructed to watch the cath site for bleeding/oozing; if seen, the patient is instructed to apply firm pressure with a clean cloth and call [address] Cardiology at [phone]. The patient is instructed to watch for signs of infection which include: increasing area of redness, fever/hot to touch or purulent drainage at the catheterization site. The patient is instructed not to soak in a bathtub for 7-10 days, but is cleared to shower. The patient is instructed to call the office or return to the ER for immediate evaluation for any shortness of breath or chest pain not relieved by NTG. Discharge Exam:   Visit Vitals  BP (!) 104/55 (BP 1 Location: Left arm, BP Patient Position: At rest)   Pulse (!) 57   Temp 97.4 °F (36.3 °C)   Resp 18   Ht 6' (1.829 m)   Wt 67.2 kg (148 lb 3.2 oz)   SpO2 97%   BMI 20.10 kg/m²     Patient has been seen by Dr. Trino Valdez: see his progress note for exam details. Recent Results (from the past 24 hour(s))   PTT    Collection Time: 12/14/20 10:39 AM   Result Value Ref Range    aPTT 26.6 24.1 - 70.7 SEC   METABOLIC PANEL, COMPREHENSIVE    Collection Time: 12/14/20 10:39 AM   Result Value Ref Range    Sodium 138 138 - 145 mmol/L    Potassium 4.1 3.5 - 5.1 mmol/L    Chloride 105 98 - 107 mmol/L    CO2 30 21 - 32 mmol/L    Anion gap 3 (L) 7 - 16 mmol/L    Glucose 269 (H) 65 - 100 mg/dL    BUN 17 6 - 23 MG/DL    Creatinine 0.83 0.8 - 1.5 MG/DL    GFR est AA >60 >60 ml/min/1.73m2    GFR est non-AA >60 >60 ml/min/1.73m2    Calcium 8.4 8.3 - 10.4 MG/DL    Bilirubin, total 0.6 0.2 - 1.1 MG/DL    ALT (SGPT) 26 12 - 65 U/L    AST (SGOT) 16 15 - 37 U/L    Alk.  phosphatase 77 50 - 136 U/L    Protein, total 6.0 (L) 6.3 - 8.2 g/dL    Albumin 3.2 (L) 3.5 - 5.0 g/dL    Globulin 2.8 2.3 - 3.5 g/dL    A-G Ratio 1.1 (L) 1.2 - 3.5     PROTHROMBIN TIME + INR    Collection Time: 12/14/20 10:39 AM   Result Value Ref Range    Prothrombin time 13.1 12.5 - 14.7 sec    INR 1.0     GLUCOSE, POC    Collection Time: 12/14/20 11:45 AM   Result Value Ref Range    Glucose (POC) 297 (H) 65 - 100 mg/dL   GLUCOSE, POC    Collection Time: 12/14/20  4:29 PM   Result Value Ref Range    Glucose (POC) 314 (H) 65 - 100 mg/dL   HEPARIN XA UFH    Collection Time: 12/14/20  5:44 PM   Result Value Ref Range    Heparin Xa UFH 0.40 0.3 - 0.7 IU/mL   GLUCOSE, POC    Collection Time: 12/14/20  9:18 PM   Result Value Ref Range    Glucose (POC) 306 (H) 65 - 100 mg/dL   HEPARIN XA UFH    Collection Time: 12/15/20  1:43 AM   Result Value Ref Range    Heparin Xa UFH 0.28 (L) 0.3 - 0.7 IU/mL   CBC W/O DIFF    Collection Time: 12/15/20  1:43 AM   Result Value Ref Range    WBC 6.0 4.3 - 11.1 K/uL    RBC 3.34 (L) 4.23 - 5.6 M/uL    HGB 11.4 (L) 13.6 - 17.2 g/dL    HCT 33.0 (L) 41.1 - 50.3 %    MCV 98.8 (H) 79.6 - 97.8 FL    MCH 34.1 (H) 26.1 - 32.9 PG    MCHC 34.5 31.4 - 35.0 g/dL    RDW 11.9 11.9 - 14.6 %    PLATELET 026 011 - 787 K/uL    MPV 10.4 9.4 - 12.3 FL    ABSOLUTE NRBC 0.00 0.0 - 0.2 K/uL   METABOLIC PANEL, BASIC    Collection Time: 12/15/20  1:43 AM   Result Value Ref Range    Sodium 139 136 - 145 mmol/L    Potassium 4.1 3.5 - 5.1 mmol/L    Chloride 104 98 - 107 mmol/L    CO2 32 21 - 32 mmol/L    Anion gap 3 (L) 7 - 16 mmol/L    Glucose 410 (H) 65 - 100 mg/dL    BUN 17 6 - 23 MG/DL    Creatinine 0.98 0.8 - 1.5 MG/DL    GFR est AA >60 >60 ml/min/1.73m2    GFR est non-AA >60 >60 ml/min/1.73m2    Calcium 8.2 (L) 8.3 - 10.4 MG/DL   GLUCOSE, POC    Collection Time: 12/15/20  6:28 AM   Result Value Ref Range    Glucose (POC) 304 (H) 65 - 100 mg/dL         Patient Instructions:     Current Discharge Medication List      START taking these medications    Details   atorvastatin (LIPITOR) 80 mg tablet Take 1 Tab by mouth daily. Qty: 30 Tab, Refills: 5      metoprolol tartrate (LOPRESSOR) 25 mg tablet Take 0.5 Tabs by mouth every twelve (12) hours. Qty: 60 Tab, Refills: 5      heparin sodium,porcine/D5W (heparin 25,000 units in dextrose 500 mL) 25,000 unit/500 mL (50 unit/mL) infusion 808.8-1,685 Units/hr by IntraVENous route TITRATE.   Qty: 250 mL, Refills: 0 nitroglycerin (NITROSTAT) 0.4 mg SL tablet 1 Tab by SubLINGual route every five (5) minutes as needed for Chest Pain. Up to 3 doses. Qty: 1 Bottle, Refills: 5      !! insulin lispro (HUMALOG) 100 unit/mL injection INITIATE INSULIN CORRECTIVE PROTOCOL:  Insulin High Sensitivity (thin, ESRD)  For Blood Sugar (mg/dL) of:              Less than 150 =   0 units  150 -199 =  1 units  200 -249 =   2 units  250 -299 =   3 units  300 -349 =   4 units  350 or greater = 5 units and Call Physician  Initiate Hypoglycemic protocol if blood glucose is <70 mg/dL. Fast Acting - Administer Immediately - or within 15 minutes of start of meal, if mealtime coverage. Qty: 1 Vial, Refills: 3      !! insulin lispro (HUMALOG) 100 unit/mL injection 2 Units SQ 3 times daily before meals  Qty: 1 Vial, Refills: 0      insulin glargine (LANTUS) 100 unit/mL injection Give 21 units at bedtime  Qty: 1 Vial, Refills: 0       !! - Potential duplicate medications found. Please discuss with provider. CONTINUE these medications which have NOT CHANGED    Details   aspirin delayed-release 81 mg tablet Take 81 mg by mouth daily. levothyroxine (Synthroid) 100 mcg tablet Take 100 mcg by mouth Daily (before breakfast).          STOP taking these medications       insulin aspart U-100 (NovoLOG Flexpen U-100 Insulin) 100 unit/mL (3 mL) inpn Comments:   Reason for Stopping:         clopidogreL (Plavix) 75 mg tab Comments:   Reason for Stopping:                 Signed:  NARCISA Waller  12/15/2020  7:36 AM

## 2020-12-15 NOTE — ROUTINE PROCESS
Verbal bedside report received from [facility]. Assumed care of patient. Heparin @ 12 IV drip verified at bedside with outgoing RN.

## 2020-12-15 NOTE — ROUTINE PROCESS
Bedside and Verbal shift change report to be given to Rikki Church RN (oncoming nurse) by self (offgoing nurse). Report included the following information SBAR, Kardex, Procedure Summary, Intake/Output and MAR. Right radial c/d/i. Heparin gtt verified

## 2020-12-15 NOTE — DISCHARGE INSTRUCTIONS
Patient Education        Angina: Care Instructions  Your Care Instructions     You have a problem called angina. Angina happens when there is not enough blood flow to your heart muscle. Angina is a sign of coronary artery disease (CAD). CAD occurs when blood vessels that supply the heart become narrowed. Having CAD increases your risk of a heart attack. Chest pain or pressure is the most common symptom of angina. But some people have other symptoms, like:  · Pain, pressure, or a strange feeling in the back, neck, jaw, or upper belly, or in one or both shoulders or arms. · Shortness of breath. · Nausea or vomiting. · Lightheadedness or sudden weakness. · Fast or irregular heartbeat. Women are somewhat more likely than men to have angina symptoms like shortness of breath, nausea, and back or jaw pain. Angina can be dangerous. That's why it is important to pay attention to your symptoms. Know what is typical for you, learn how to control your symptoms, and understand when you need to get treatment. A change in your usual pattern of symptoms is an emergency. It may mean that you are having a heart attack. The doctor has checked you carefully, but problems can develop later. If you notice any problems or new symptoms, get medical treatment right away. Follow-up care is a key part of your treatment and safety. Be sure to make and go to all appointments, and call your doctor if you are having problems. It's also a good idea to know your test results and keep a list of the medicines you take. How can you care for yourself at home? Medicines    · If your doctor has given you nitroglycerin for angina symptoms, keep it with you at all times. If you have symptoms, sit down and rest, and take the first dose of nitroglycerin as directed. If your symptoms get worse or are not getting better within 5 minutes, call 911 right away. Stay on the phone.  The emergency  will give you further instructions.     · If your doctor advises it, take 1 low-dose aspirin a day to prevent heart attack.     · Be safe with medicines. Take your medicines exactly as prescribed. Call your doctor if you think you are having a problem with your medicine. You will get more details on the specific medicines your doctor prescribes. Lifestyle changes    · Do not smoke. If you need help quitting, talk to your doctor about stop-smoking programs and medicines. These can increase your chances of quitting for good.     · Eat a heart-healthy diet that is low in saturated fat and salt, and is high in fiber. Talk to your doctor or a dietitian about healthy eating.     · Stay at a healthy weight. Or lose weight if you need to. Activity    · Talk to your doctor about a level of activity that is safe for you.     · If an activity causes angina symptoms, stop and rest.   When should you call for help? Call 911 anytime you think you may need emergency care. For example, call if:    · You passed out (lost consciousness).     · You have symptoms of a heart attack. These may include:  ? Chest pain or pressure, or a strange feeling in the chest.  ? Sweating. ? Shortness of breath. ? Nausea or vomiting. ? Pain, pressure, or a strange feeling in the back, neck, jaw, or upper belly or in one or both shoulders or arms. ? Lightheadedness or sudden weakness. ? A fast or irregular heartbeat. After you call 911, the  may tell you to chew 1 adult-strength or 2 to 4 low-dose aspirin. Wait for an ambulance. Do not try to drive yourself.     · You have angina symptoms that do not go away with rest or are not getting better within 5 minutes after you take a dose of nitroglycerin. Call your doctor now if:    · Your angina symptoms seem worse but still follow your typical pattern. You can predict when symptoms will happen, but they may come on sooner, feel worse, or last longer.     · You feel dizzy or lightheaded, or you feel like you may faint.    Watch closely for changes in your health, and be sure to contact your doctor if you have any problems. Where can you learn more? Go to http://www.gray.com/  Enter H129 in the search box to learn more about \"Angina: Care Instructions. \"  Current as of: December 16, 2019               Content Version: 12.6  © 5374-8972 [employer]. Care instructions adapted under license by [employer] (which disclaims liability or warranty for this information). If you have questions about a medical condition or this instruction, always ask your healthcare professional. [address] any warranty or liability for your use of this information.

## 2020-12-15 NOTE — PROGRESS NOTES
12/15/20 4938   Section 1: Provider Certification   Patient Condition Stable for transfer   Reason for transfer Patient examined and risks explained (Provider Certification)   Qualifying Reason(s) for Transfer Service(s) needed unavailable. (Pt not able to make needed follow up as home [place] in [place])   Benefits of Transfer Services available at receiving facility;Summary (Comment); Staff of receiving facility are capable of providing the level of care needed   Risks of Transfer Accidents or delays; Loss of IV   Accepting Provider Dr Chanelle Hickey   Sending Provider Opal Hendricks MD   Physician to Physician Communication Yes (include both names in comments)  (Dr Cecelia Curtis and Dr Chanelle Hickey)   Transport Mode [facility]   Acceptance Date 12/14/20   Acceptance Time 1700       Dr Opal Hendricks  12/15/20  9:55 AM

## 2020-12-15 NOTE — PROGRESS NOTES
Pt refusing to take full 4 units of SSI. Miki Garcia MD notified. Holding SSI and gave 18 units of Lantus.

## 2020-12-16 NOTE — PROGRESS NOTES
Spiritual Care Follow up. Patient, who needs Cardiac Surgery, has been discharged. His insurance will not pay for CABG at this facility. He may go to [facility] for surgery there. Visit by Xavier Coleman, Staff .  STELLA.Hayden., Th.B., B.A.

## 2023-05-30 ENCOUNTER — APPOINTMENT (RX ONLY)
Dept: URBAN - METROPOLITAN AREA CLINIC 41 | Facility: CLINIC | Age: 63
Setting detail: DERMATOLOGY
End: 2023-05-30

## 2023-05-30 DIAGNOSIS — L91.8 OTHER HYPERTROPHIC DISORDERS OF THE SKIN: ICD-10-CM

## 2023-05-30 DIAGNOSIS — D22 MELANOCYTIC NEVI: ICD-10-CM

## 2023-05-30 DIAGNOSIS — L72.0 EPIDERMAL CYST: ICD-10-CM

## 2023-05-30 DIAGNOSIS — L81.4 OTHER MELANIN HYPERPIGMENTATION: ICD-10-CM

## 2023-05-30 DIAGNOSIS — L82.1 OTHER SEBORRHEIC KERATOSIS: ICD-10-CM

## 2023-05-30 DIAGNOSIS — D18.0 HEMANGIOMA: ICD-10-CM

## 2023-05-30 PROBLEM — D18.01 HEMANGIOMA OF SKIN AND SUBCUTANEOUS TISSUE: Status: ACTIVE | Noted: 2023-05-30

## 2023-05-30 PROBLEM — D22.5 MELANOCYTIC NEVI OF TRUNK: Status: ACTIVE | Noted: 2023-05-30

## 2023-05-30 PROBLEM — D22.39 MELANOCYTIC NEVI OF OTHER PARTS OF FACE: Status: ACTIVE | Noted: 2023-05-30

## 2023-05-30 PROCEDURE — 99203 OFFICE O/P NEW LOW 30 MIN: CPT

## 2023-05-30 PROCEDURE — ? COUNSELING

## 2023-05-30 ASSESSMENT — LOCATION DETAILED DESCRIPTION DERM
LOCATION DETAILED: LEFT AXILLARY VAULT
LOCATION DETAILED: RIGHT MID-UPPER BACK
LOCATION DETAILED: LEFT SUPERIOR UPPER BACK
LOCATION DETAILED: LEFT MEDIAL FOREHEAD
LOCATION DETAILED: RIGHT CENTRAL MALAR CHEEK
LOCATION DETAILED: RIGHT VENTRAL PROXIMAL FOREARM
LOCATION DETAILED: LEFT MEDIAL INFERIOR CHEST
LOCATION DETAILED: PERIUMBILICAL SKIN
LOCATION DETAILED: LEFT MEDIAL SUPERIOR CHEST
LOCATION DETAILED: LEFT CENTRAL MALAR CHEEK

## 2023-05-30 ASSESSMENT — LOCATION SIMPLE DESCRIPTION DERM
LOCATION SIMPLE: CHEST
LOCATION SIMPLE: RIGHT UPPER BACK
LOCATION SIMPLE: RIGHT FOREARM
LOCATION SIMPLE: LEFT UPPER BACK
LOCATION SIMPLE: RIGHT CHEEK
LOCATION SIMPLE: LEFT CHEEK
LOCATION SIMPLE: LEFT AXILLARY VAULT
LOCATION SIMPLE: ABDOMEN
LOCATION SIMPLE: LEFT FOREHEAD

## 2023-05-30 ASSESSMENT — LOCATION ZONE DERM
LOCATION ZONE: ARM
LOCATION ZONE: AXILLAE
LOCATION ZONE: TRUNK
LOCATION ZONE: FACE

## 2023-05-30 NOTE — PROCEDURE: COUNSELING
Detail Level: Simple
Detail Level: Generalized
Detail Level: Detailed
Sunscreen Recommendations: Clinically unchaged based on photos, and she's had a negative [employer] test as well. No signs pf problems, continue regular self exams and yearly checks here.

## 2024-11-05 ENCOUNTER — APPOINTMENT (RX ONLY)
Dept: URBAN - METROPOLITAN AREA CLINIC 41 | Facility: CLINIC | Age: 64
Setting detail: DERMATOLOGY
End: 2024-11-05

## 2024-11-05 DIAGNOSIS — L91.8 OTHER HYPERTROPHIC DISORDERS OF THE SKIN: ICD-10-CM

## 2024-11-05 DIAGNOSIS — D22 MELANOCYTIC NEVI: ICD-10-CM

## 2024-11-05 DIAGNOSIS — L82.1 OTHER SEBORRHEIC KERATOSIS: ICD-10-CM

## 2024-11-05 DIAGNOSIS — L81.4 OTHER MELANIN HYPERPIGMENTATION: ICD-10-CM

## 2024-11-05 DIAGNOSIS — D18.0 HEMANGIOMA: ICD-10-CM

## 2024-11-05 PROBLEM — D18.01 HEMANGIOMA OF SKIN AND SUBCUTANEOUS TISSUE: Status: ACTIVE | Noted: 2024-11-05

## 2024-11-05 PROBLEM — D22.5 MELANOCYTIC NEVI OF TRUNK: Status: ACTIVE | Noted: 2024-11-05

## 2024-11-05 PROBLEM — D22.39 MELANOCYTIC NEVI OF OTHER PARTS OF FACE: Status: ACTIVE | Noted: 2024-11-05

## 2024-11-05 PROCEDURE — 99213 OFFICE O/P EST LOW 20 MIN: CPT

## 2024-11-05 PROCEDURE — ? COUNSELING

## 2024-11-05 PROCEDURE — ? SUNSCREEN RECOMMENDATIONS

## 2024-11-05 ASSESSMENT — LOCATION ZONE DERM
LOCATION ZONE: ARM
LOCATION ZONE: AXILLAE
LOCATION ZONE: EYELID
LOCATION ZONE: LEG
LOCATION ZONE: FACE
LOCATION ZONE: TRUNK

## 2024-11-05 ASSESSMENT — LOCATION DETAILED DESCRIPTION DERM
LOCATION DETAILED: LEFT LATERAL INFERIOR EYELID
LOCATION DETAILED: STERNUM
LOCATION DETAILED: LEFT AXILLARY VAULT
LOCATION DETAILED: RIGHT MID-UPPER BACK
LOCATION DETAILED: LEFT PROXIMAL PRETIBIAL REGION
LOCATION DETAILED: SUPRAPUBIC SKIN
LOCATION DETAILED: RIGHT INFERIOR MEDIAL UPPER BACK
LOCATION DETAILED: LEFT CENTRAL MALAR CHEEK
LOCATION DETAILED: LEFT PROXIMAL DORSAL FOREARM
LOCATION DETAILED: LEFT LATERAL DISTAL PRETIBIAL REGION
LOCATION DETAILED: RIGHT SUPERIOR UPPER BACK
LOCATION DETAILED: RIGHT CENTRAL MALAR CHEEK
LOCATION DETAILED: RIGHT DISTAL DORSAL FOREARM
LOCATION DETAILED: EPIGASTRIC SKIN
LOCATION DETAILED: PERIUMBILICAL SKIN
LOCATION DETAILED: RIGHT AXILLARY VAULT

## 2024-11-05 ASSESSMENT — LOCATION SIMPLE DESCRIPTION DERM
LOCATION SIMPLE: GROIN
LOCATION SIMPLE: RIGHT AXILLARY VAULT
LOCATION SIMPLE: LEFT AXILLARY VAULT
LOCATION SIMPLE: RIGHT CHEEK
LOCATION SIMPLE: LEFT FOREARM
LOCATION SIMPLE: LEFT INFERIOR EYELID
LOCATION SIMPLE: RIGHT FOREARM
LOCATION SIMPLE: CHEST
LOCATION SIMPLE: ABDOMEN
LOCATION SIMPLE: RIGHT UPPER BACK
LOCATION SIMPLE: LEFT PRETIBIAL REGION
LOCATION SIMPLE: LEFT CHEEK

## 2024-11-05 NOTE — HPI: EVALUATION OF SKIN LESION(S)
What Type Of Note Output Would You Prefer (Optional)?: Standard Output
Hpi Title: Evaluation of Skin Lesions
How Severe Are Your Spot(S)?: mild
Have Your Spot(S) Been Treated In The Past?: has not been treated
Family Member: Father
Additional History: Patient has a spot on his left lower leg and back